# Patient Record
Sex: MALE | Race: WHITE | NOT HISPANIC OR LATINO | URBAN - METROPOLITAN AREA
[De-identification: names, ages, dates, MRNs, and addresses within clinical notes are randomized per-mention and may not be internally consistent; named-entity substitution may affect disease eponyms.]

---

## 2020-10-19 ENCOUNTER — EMERGENCY (EMERGENCY)
Facility: HOSPITAL | Age: 55
LOS: 1 days | Discharge: HOME | End: 2020-10-19
Attending: STUDENT IN AN ORGANIZED HEALTH CARE EDUCATION/TRAINING PROGRAM
Payer: COMMERCIAL

## 2020-10-19 VITALS
OXYGEN SATURATION: 99 % | RESPIRATION RATE: 18 BRPM | SYSTOLIC BLOOD PRESSURE: 191 MMHG | HEART RATE: 104 BPM | DIASTOLIC BLOOD PRESSURE: 105 MMHG | TEMPERATURE: 98 F

## 2020-10-19 VITALS — DIASTOLIC BLOOD PRESSURE: 98 MMHG | SYSTOLIC BLOOD PRESSURE: 163 MMHG

## 2020-10-19 PROCEDURE — 99284 EMERGENCY DEPT VISIT MOD MDM: CPT

## 2020-10-19 PROCEDURE — 93005 ELECTROCARDIOGRAM TRACING: CPT

## 2020-10-19 PROCEDURE — 99285 EMERGENCY DEPT VISIT HI MDM: CPT

## 2020-10-19 PROCEDURE — 93010 ELECTROCARDIOGRAM REPORT: CPT

## 2020-10-19 NOTE — ED PROVIDER NOTE - CARE PLAN
Principal Discharge DX:	Marijuana intoxication, uncomplicated  Secondary Diagnosis:	Elevated blood pressure, situational

## 2020-10-19 NOTE — ED PROVIDER NOTE - OBJECTIVE STATEMENT
56 y/o male with PMHx of    presents to ED c/o ingestion. Patient reports he was cleaning his sons room, opened a draw, and found some watermelon candy and ate it. Patient's son states they were THC candy, and patient started feeling weird afterwards. States everything was foggy, and was unable to tell if the words were coming out of his mouth while talking to his girlfriend. Reports he feels like he is still high, but is no longer panicked.    Denies drug use, N/V 56 y/o male with PMHx of presents to ED c/o ingestion. Patient reports he was cleaning his sons room, opened a draw, and found some watermelon candy and ate it. Patient started feeling weird afterwards. States everything was foggy, and was unable to tell if the words were coming out of his mouth while talking to his girlfriend. Patient called his son and found out they were THC candy. Reports he feels like he is still high, but is no longer panicked. Fels much better after finding out what he took.    Denies drug use, N/V

## 2020-10-19 NOTE — ED PROVIDER NOTE - NSFOLLOWUPINSTRUCTIONS_ED_ALL_ED_FT
1) Follow up with your doctor in 1 week for re-evaluation of your blood pressure  2) Return to the ER for worsening or concerning symptoms    Hypertension    WHAT YOU NEED TO KNOW:    Hypertension is high blood pressure. Your blood pressure is the force of your blood moving against the walls of your arteries. Hypertension causes your blood pressure to get so high that your heart has to work much harder than normal. This can damage your heart. The cause of hypertension may not be known. This is called essential or primary hypertension. Hypertension caused by another medical condition, such as kidney disease, is called secondary hypertension.    DISCHARGE INSTRUCTIONS:    Call 911 for any of the following:   •You have chest pain.      •You have any of the following signs of a heart attack: ?Squeezing, pressure, or pain in your chest      ?You may also have any of the following: ?Discomfort or pain in your back, neck, jaw, stomach, or arm      ?Shortness of breath      ?Nausea or vomiting      ?Lightheadedness or a sudden cold sweat        •You become confused or have difficulty speaking.      •You suddenly feel lightheaded or have trouble breathing.      Return to the emergency department if:   •You have a severe headache or vision loss.      •You have weakness in an arm or leg.       Contact your healthcare provider if:   •You feel faint, dizzy, confused, or drowsy.       •You have been taking your blood pressure medicine but your pressure is higher than your provider says it should be.      •You have questions or concerns about your condition or care.      Medicines: You may need any of the following:   •Antihypertensives may be used to help lower your blood pressure. Several kinds of medicines are available. Your healthcare provider will choose medicines based on the kind of hypertension you have. You may need more than one type of medicine. Take the medicine exactly as directed.       •Diuretics help decrease extra fluid that collects in your body. This will help lower your blood pressure. You may urinate more often while you take this medicine.      •Cholesterol medicine helps lower your cholesterol level. A low cholesterol level helps prevent heart disease and makes it easier to control your blood pressure.       •Take your medicine as directed. Contact your healthcare provider if you think your medicine is not helping or if you have side effects. Tell him or her if you are allergic to any medicine. Keep a list of the medicines, vitamins, and herbs you take. Include the amounts, and when and why you take them. Bring the list or the pill bottles to follow-up visits. Carry your medicine list with you in case of an emergency.      Follow up with your healthcare provider as directed: You will need to return to have your blood pressure checked and to have other lab tests done. Write down your questions so you remember to ask them during your visits.     Stages of hypertension:     Blood Pressure Readings     •Normal blood pressure is 119/79 or lower. Your healthcare provider may only check your blood pressure each year if it stays at a normal level.      •Elevated blood pressure is 120/79 to 129/79. This is sometimes called prehypertension. Your healthcare provider may suggest lifestyle changes to help lower your blood pressure to a normal level. He or she may then check it again in 3 to 6 months.      •Stage 1 hypertension is 130/80 to 139/89. Your provider may recommend lifestyle changes, medication, and checks every 3 to 6 months until your blood pressure is controlled.      •Stage 2 hypertension is 140/90 or higher. Your provider will recommend lifestyle changes and have you take 2 kinds of hypertension medicines. You will also need to have your blood pressure checked monthly until it is controlled.      Manage hypertension:   •Check your blood pressure at home. Avoid smoking, caffeine, and exercise at least 30 minutes before checking your blood pressure. Sit and rest for 5 minutes before you take your blood pressure. Extend your arm and support it on a flat surface. Your arm should be at the same level as your heart. Follow the directions that came with your blood pressure monitor. Check your blood pressure 2 times, 1 minute apart, before you take your medicine in the morning. Also check your blood pressure before your evening meal. Keep a record of your readings and bring it to your follow-up visits. Ask your healthcare provider what your blood pressure should be.   How to take a Blood Pressure           •Manage any other health conditions you have. Health conditions such as diabetes can increase your risk for hypertension. Follow your healthcare provider's instructions and take all your medicines as directed.       •Ask about all medicines. Certain medicines can increase your blood pressure. Examples include oral birth control pills, decongestants, herbal supplements, and NSAIDs, such as ibuprofen. Your healthcare provider can tell you which medicines are safe for you to take. This includes prescription and over-the-counter medicines.      Lifestyle changes you can make to manage hypertension:   •Limit sodium (salt) as directed. Too much sodium can affect your fluid balance. Check labels to find low-sodium or no-salt-added foods. Some low-sodium foods use potassium salts for flavor. Too much potassium can also cause health problems. Your healthcare provider will tell you how much sodium and potassium are safe for you to have in a day. He or she may recommend that you limit sodium to 2,300 mg a day.             •Follow the meal plan recommended by your healthcare provider. A dietitian or your provider can give you more information on low-sodium plans or the DASH (Dietary Approaches to Stop Hypertension) eating plan. The DASH plan is low in sodium, unhealthy fats, and total fat. It is high in potassium, calcium, and fiber.              •Exercise to maintain a healthy weight. Exercise at least 30 minutes per day, on most days of the week. This will help decrease your blood pressure. Ask your healthcare provider about the best exercise plan for you.   Walking for Exercise           •Decrease stress. This may help lower your blood pressure. Learn ways to relax, such as deep breathing or listening to music.      •Limit alcohol as directed. Alcohol can increase your blood pressure. A drink of alcohol is 12 ounces of beer, 5 ounces of wine, or 1½ ounces of liquor.      •Do not smoke. Nicotine and other chemicals in cigarettes and cigars can increase your blood pressure and also cause lung damage. Ask your healthcare provider for information if you currently smoke and need help to quit. E-cigarettes or smokeless tobacco still contain nicotine. Talk to your healthcare provider before you use these products.   Prevent Heart Disease                  What You Need to Know About Marijuana Use      Marijuana is a mixture of the dried leaves and flowers of the hemp plant Cannabis sativa. The plant's active ingredients (cannabinoids) change the chemistry of the brain. If you smoke or eat marijuana, you will experience changes in the way you think, feel, and behave.  Many people use marijuana because it helps them relax and puts them in a pleasurable mood (marijuana high). Some people use marijuana for medical effects, such as:  •Reduced nausea.      •Increased appetite.      •Reduced muscle spasm.      •Pain relief.      Researchers are studying other possible medical uses for marijuana.      How can marijuana use affect me?    Many people find a marijuana high to be pleasurable and relaxing. Other people find a marijuana high to be uncomfortable or anxiety-causing. This drug can cause short-term and long-term physical and mental effects. Taking high doses of marijuana or trying to quit marijuana can also affect you.  Short-term effects of marijuana use include:  •Temporary relief of symptoms from a medical condition.      •Changes in mood and perception (feeling high).      •Increased hunger.      •Increased heart rate.      •Slowed movement and reaction time.      •Poor memory, judgment, and problem solving ability.      •Altered sense of time.      •Changes to vision.      •Bloodshot eyes.      •Coughing.    Long-term effects of marijuana use include:  •Higher risk of lung and breathing problems.      •Higher risk of heart attack.      •Higher risk of testicular cancer.      •Mental and physical dependence (addiction).      •Slowed brain development in young people. Babies whose mothers used marijuana during pregnancy have an increased risk of problems with brain development and behavior.      •Temporary periods of false perceptions or beliefs (hallucinations or paranoia).      •Worsening of mental illness.      •Onset of new mental illness such as anxiety, depression, or suicidal thoughts.      •Withdrawal symptoms when stopping marijuana, such as sleeplessness, anxiety, cravings, and anger.      •Difficulty maintaining healthy relationships.      •Poor memory, and difficulty concentrating and learning. This can result in decreased intelligence and poor performance at school or work, and an increased risk of dropping out of school.      •Higher risk of using other substances like alcohol and nicotine.    High doses of marijuana can cause:  •Panic.      •Anxiety.      •Mental confusion.      •Hallucinations.    Quitting marijuana after using it for a long time can cause withdrawal symptoms, such as:  •Headache.      •Shakiness.      •Sweating.      •Stomach pain.      •Nausea.      •Restlessness.      •Irritability.      •Trouble sleeping.      •Decreased appetite.        What are the benefits of not using marijuana?    Not using marijuana can keep you from becoming dependent on it. You can avoid the negative effects of the drug that can reduce your quality of life. You can avoid accidents caused by the slowed reaction time that is common with marijuana use.      If I already use marijuana, what steps can I take to stop using it?    If you are not physically or mentally dependent on marijuana, you should be able to stop using it on your own. If you cannot stop on your own, ask your health care provider for help. Treatment for marijuana addiction is similar to treatment for other addictions. It may include:  •Cognitive-behavioral therapy (psychotherapy). This may include individual or group therapy.      •Joining a support group.      •Treating medical, behavioral, or mental health conditions that exist along with marijuana dependency.        Where can I get more information?  Learn more about:  •Marijuana from the [place] National [place] on Drug Abuse: https://www.drugabuse.gov/publications/drugfacts/marijuana      •Medical marijuana from the National Institutes of Health: https://nccih.nih.gov/health/marijuana      •Treatment options from the Substance Abuse and Mental Health Services Administration: https://findtreatment.[facility].gov/      •Recovery from marijuana dependency from Recovery.org: http://www.recovery.org/topics/marijuana-recovery        When should I seek medical care?  Talk with your health care provider if:  •You want to stop using marijuana but you cannot.      •You have withdrawal symptoms when you try to stop using marijuana.      •You are using marijuana every day.      •You are using marijuana along with other drugs like cocaine or alcohol.      •You have anxiety or depression.      •You have hallucinations or paranoia.      •Marijuana use is interfering with your relationships or your ability to function normally at school or at work.        Summary    •You may become physically or mentally dependent on marijuana.      •Long-term use may interfere with your ability to function normally at home, school, or work.      •Marijuana addiction is treatable.      This information is not intended to replace advice given to you by your health care provider. Make sure you discuss any questions you have with your health care provider.

## 2020-10-19 NOTE — ED ADULT TRIAGE NOTE - CHIEF COMPLAINT QUOTE
Unknowingly ate a cannabis edible. Was anxious and "foggy" but feels better now that he knows what it was. Hypertensive, no history of htn.

## 2020-10-19 NOTE — ED PROVIDER NOTE - PATIENT PORTAL LINK FT
You can access the FollowMyHealth Patient Portal offered by [facility] by registering at the following website: [email or web address]. By joining [facility]’s FollowMyHealth portal, you will also be able to view your health information using other applications (apps) compatible with our system.

## 2020-10-19 NOTE — ED PROVIDER NOTE - CLINICAL SUMMARY MEDICAL DECISION MAKING FREE TEXT BOX
Patient accidently ingested THC. Patient is mildly lightheaded, but otherwise well. Clearly suffering from effects of THC that was ingested, but no concerning symptoms, will re-assess vitals and likely D/C home into the care of his girlfriend.

## 2020-11-19 PROBLEM — Z78.9 OTHER SPECIFIED HEALTH STATUS: Chronic | Status: ACTIVE | Noted: 2020-10-21

## 2021-01-26 ENCOUNTER — APPOINTMENT (OUTPATIENT)
Dept: FAMILY MEDICINE | Facility: CLINIC | Age: 56
End: 2021-01-26
Payer: COMMERCIAL

## 2021-01-26 ENCOUNTER — TRANSCRIPTION ENCOUNTER (OUTPATIENT)
Age: 56
End: 2021-01-26

## 2021-01-26 ENCOUNTER — NON-APPOINTMENT (OUTPATIENT)
Age: 56
End: 2021-01-26

## 2021-01-26 VITALS
OXYGEN SATURATION: 99 % | TEMPERATURE: 97.5 F | RESPIRATION RATE: 14 BRPM | HEIGHT: 70.5 IN | HEART RATE: 69 BPM | DIASTOLIC BLOOD PRESSURE: 98 MMHG | SYSTOLIC BLOOD PRESSURE: 140 MMHG | BODY MASS INDEX: 29.73 KG/M2 | WEIGHT: 210 LBS

## 2021-01-26 DIAGNOSIS — Z11.52 ENCOUNTER FOR SCREENING FOR COVID-19: ICD-10-CM

## 2021-01-26 DIAGNOSIS — Z72.89 OTHER PROBLEMS RELATED TO LIFESTYLE: ICD-10-CM

## 2021-01-26 DIAGNOSIS — R17 UNSPECIFIED JAUNDICE: ICD-10-CM

## 2021-01-26 DIAGNOSIS — Z63.5 DISRUPTION OF FAMILY BY SEPARATION AND DIVORCE: ICD-10-CM

## 2021-01-26 DIAGNOSIS — Z00.00 ENCOUNTER FOR GENERAL ADULT MEDICAL EXAMINATION W/OUT ABNORMAL FINDINGS: ICD-10-CM

## 2021-01-26 DIAGNOSIS — Z80.42 FAMILY HISTORY OF MALIGNANT NEOPLASM OF PROSTATE: ICD-10-CM

## 2021-01-26 DIAGNOSIS — Z80.52 FAMILY HISTORY OF MALIGNANT NEOPLASM OF BLADDER: ICD-10-CM

## 2021-01-26 PROCEDURE — 99072 ADDL SUPL MATRL&STAF TM PHE: CPT

## 2021-01-26 PROCEDURE — 99386 PREV VISIT NEW AGE 40-64: CPT

## 2021-01-26 SDOH — SOCIAL STABILITY - SOCIAL INSECURITY: DISRUPTION OF FAMILY BY SEPARATION AND DIVORCE: Z63.5

## 2021-01-26 NOTE — ASSESSMENT
[FreeTextEntry1] : HM: \par will do comprehensive blood work , screen for hyperlipidemia , diabetes and thyroid disorder.\par will review the results and address if abnormal.\par Will also do a PSA to screen for prostate CA.\par Elevated BP: \par pt. reported that he BP stays with in normal range at home , he have a machine at home. He thinks its probably white coat syndrome. \par Advised DASH diet, home monitoring of BP.\par If BP reading stays high multiple days , recommended for a f/u appt.

## 2021-01-26 NOTE — HEALTH RISK ASSESSMENT
[Excellent] : ~his/her~  mood as  excellent [No] : In the past 12 months have you used drugs other than those required for medical reasons? No [No falls in past year] : Patient reported no falls in the past year [0] : 2) Feeling down, depressed, or hopeless: Not at all (0) [Patient reported colonoscopy was normal] : Patient reported colonoscopy was normal [HIV test declined] : HIV test declined [Hepatitis C test declined] : Hepatitis C test declined [None] : None [With Family] : lives with family [Employed] : employed [Sexually Active] : sexually active [Feels Safe at Home] : Feels safe at home [Fully functional (bathing, dressing, toileting, transferring, walking, feeding)] : Fully functional (bathing, dressing, toileting, transferring, walking, feeding) [Reports changes in dental health] : Reports changes in dental health [Smoke Detector] : smoke detector [Carbon Monoxide Detector] : carbon monoxide detector [Seat Belt] :  uses seat belt [] : No [Change in mental status noted] : No change in mental status noted [Language] : denies difficulty with language [Behavior] : denies difficulty with behavior [High Risk Behavior] : no high risk behavior [Reports changes in hearing] : Reports no changes in hearing [Reports changes in vision] : Reports no changes in vision [ColonoscopyDate] : 10/02/2016 [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

## 2021-01-26 NOTE — HISTORY OF PRESENT ILLNESS
[de-identified] : Mr. YOKASTA SY is White  55 year M seen today for wellness exam. He have no known medical history. He is taking any prescription or OTC medication.\par He is also requesting for a covid-19 antibody test.\par \par

## 2021-01-27 DIAGNOSIS — R97.20 ELEVATED PROSTATE, SPECIFIC ANTIGEN [PSA]: ICD-10-CM

## 2021-01-27 LAB
ALBUMIN SERPL ELPH-MCNC: 5.1 G/DL
ALP BLD-CCNC: 70 U/L
ALT SERPL-CCNC: 20 U/L
ANION GAP SERPL CALC-SCNC: 14 MMOL/L
APPEARANCE: CLEAR
AST SERPL-CCNC: 21 U/L
BACTERIA: NEGATIVE
BASOPHILS # BLD AUTO: 0.07 K/UL
BASOPHILS NFR BLD AUTO: 1 %
BILIRUB SERPL-MCNC: 1.5 MG/DL
BILIRUBIN URINE: NEGATIVE
BLOOD URINE: NEGATIVE
BUN SERPL-MCNC: 22 MG/DL
CALCIUM SERPL-MCNC: 10.3 MG/DL
CHLORIDE SERPL-SCNC: 103 MMOL/L
CHOLEST SERPL-MCNC: 191 MG/DL
CO2 SERPL-SCNC: 23 MMOL/L
COLOR: COLORLESS
CREAT SERPL-MCNC: 1.08 MG/DL
EOSINOPHIL # BLD AUTO: 0.26 K/UL
EOSINOPHIL NFR BLD AUTO: 3.6 %
ESTIMATED AVERAGE GLUCOSE: 103 MG/DL
GLUCOSE QUALITATIVE U: NEGATIVE
GLUCOSE SERPL-MCNC: 102 MG/DL
HBA1C MFR BLD HPLC: 5.2 %
HCT VFR BLD CALC: 51.3 %
HDLC SERPL-MCNC: 49 MG/DL
HGB BLD-MCNC: 17.1 G/DL
HYALINE CASTS: 0 /LPF
IMM GRANULOCYTES NFR BLD AUTO: 0.3 %
KETONES URINE: NEGATIVE
LDLC SERPL CALC-MCNC: 119 MG/DL
LEUKOCYTE ESTERASE URINE: NEGATIVE
LYMPHOCYTES # BLD AUTO: 1.87 K/UL
LYMPHOCYTES NFR BLD AUTO: 25.8 %
MAN DIFF?: NORMAL
MCHC RBC-ENTMCNC: 30.6 PG
MCHC RBC-ENTMCNC: 33.3 GM/DL
MCV RBC AUTO: 91.8 FL
MICROSCOPIC-UA: NORMAL
MONOCYTES # BLD AUTO: 0.71 K/UL
MONOCYTES NFR BLD AUTO: 9.8 %
NEUTROPHILS # BLD AUTO: 4.32 K/UL
NEUTROPHILS NFR BLD AUTO: 59.5 %
NITRITE URINE: NEGATIVE
NONHDLC SERPL-MCNC: 142 MG/DL
PH URINE: 6
PLATELET # BLD AUTO: 225 K/UL
POTASSIUM SERPL-SCNC: 5 MMOL/L
PROT SERPL-MCNC: 7.4 G/DL
PROTEIN URINE: NEGATIVE
PSA SERPL-MCNC: 5.29 NG/ML
RBC # BLD: 5.59 M/UL
RBC # FLD: 12.5 %
RED BLOOD CELLS URINE: 0 /HPF
SARS-COV-2 IGG SERPL IA-ACNC: 10 INDEX
SARS-COV-2 IGG SERPL QL IA: POSITIVE
SODIUM SERPL-SCNC: 140 MMOL/L
SPECIFIC GRAVITY URINE: 1
SQUAMOUS EPITHELIAL CELLS: 0 /HPF
TRIGL SERPL-MCNC: 116 MG/DL
TSH SERPL-ACNC: 3.71 UIU/ML
UROBILINOGEN URINE: NORMAL
WBC # FLD AUTO: 7.25 K/UL
WHITE BLOOD CELLS URINE: 0 /HPF

## 2021-02-03 ENCOUNTER — APPOINTMENT (OUTPATIENT)
Dept: INTERNAL MEDICINE | Facility: CLINIC | Age: 56
End: 2021-02-03

## 2022-02-15 ENCOUNTER — APPOINTMENT (OUTPATIENT)
Dept: INTERNAL MEDICINE | Facility: CLINIC | Age: 57
End: 2022-02-15

## 2022-06-11 ENCOUNTER — INPATIENT (INPATIENT)
Facility: HOSPITAL | Age: 57
LOS: 1 days | Discharge: HOME | DRG: 482 | End: 2022-06-13
Attending: ORTHOPAEDIC SURGERY | Admitting: ORTHOPAEDIC SURGERY
Payer: COMMERCIAL

## 2022-06-11 VITALS — WEIGHT: 188.05 LBS | HEIGHT: 70 IN

## 2022-06-11 DIAGNOSIS — S72.009A FRACTURE OF UNSPECIFIED PART OF NECK OF UNSPECIFIED FEMUR, INITIAL ENCOUNTER FOR CLOSED FRACTURE: ICD-10-CM

## 2022-06-11 LAB
ALBUMIN SERPL ELPH-MCNC: 4.5 G/DL — SIGNIFICANT CHANGE UP (ref 3.3–5)
ALP SERPL-CCNC: 73 U/L — SIGNIFICANT CHANGE UP (ref 40–120)
ALT FLD-CCNC: 32 U/L — SIGNIFICANT CHANGE UP (ref 12–78)
ANION GAP SERPL CALC-SCNC: 7 MMOL/L — SIGNIFICANT CHANGE UP (ref 5–17)
APTT BLD: 28.9 SEC — SIGNIFICANT CHANGE UP (ref 27.5–35.5)
AST SERPL-CCNC: 24 U/L — SIGNIFICANT CHANGE UP (ref 15–37)
BASOPHILS # BLD AUTO: 0.09 K/UL — SIGNIFICANT CHANGE UP (ref 0–0.2)
BASOPHILS NFR BLD AUTO: 0.6 % — SIGNIFICANT CHANGE UP (ref 0–2)
BILIRUB SERPL-MCNC: 1.2 MG/DL — SIGNIFICANT CHANGE UP (ref 0.2–1.2)
BUN SERPL-MCNC: 19 MG/DL — SIGNIFICANT CHANGE UP (ref 7–23)
CALCIUM SERPL-MCNC: 9.5 MG/DL — SIGNIFICANT CHANGE UP (ref 8.5–10.1)
CHLORIDE SERPL-SCNC: 109 MMOL/L — HIGH (ref 96–108)
CO2 SERPL-SCNC: 25 MMOL/L — SIGNIFICANT CHANGE UP (ref 22–31)
CREAT SERPL-MCNC: 1.01 MG/DL — SIGNIFICANT CHANGE UP (ref 0.5–1.3)
EGFR: 86 ML/MIN/1.73M2 — SIGNIFICANT CHANGE UP
EOSINOPHIL # BLD AUTO: 0.2 K/UL — SIGNIFICANT CHANGE UP (ref 0–0.5)
EOSINOPHIL NFR BLD AUTO: 1.4 % — SIGNIFICANT CHANGE UP (ref 0–6)
GLUCOSE SERPL-MCNC: 112 MG/DL — HIGH (ref 70–99)
HCT VFR BLD CALC: 46.2 % — SIGNIFICANT CHANGE UP (ref 39–50)
HGB BLD-MCNC: 16 G/DL — SIGNIFICANT CHANGE UP (ref 13–17)
IMM GRANULOCYTES NFR BLD AUTO: 0.5 % — SIGNIFICANT CHANGE UP (ref 0–1.5)
INR BLD: 1.03 RATIO — SIGNIFICANT CHANGE UP (ref 0.88–1.16)
LYMPHOCYTES # BLD AUTO: 1.33 K/UL — SIGNIFICANT CHANGE UP (ref 1–3.3)
LYMPHOCYTES # BLD AUTO: 9.3 % — LOW (ref 13–44)
MCHC RBC-ENTMCNC: 30.3 PG — SIGNIFICANT CHANGE UP (ref 27–34)
MCHC RBC-ENTMCNC: 34.6 GM/DL — SIGNIFICANT CHANGE UP (ref 32–36)
MCV RBC AUTO: 87.5 FL — SIGNIFICANT CHANGE UP (ref 80–100)
MONOCYTES # BLD AUTO: 0.94 K/UL — HIGH (ref 0–0.9)
MONOCYTES NFR BLD AUTO: 6.6 % — SIGNIFICANT CHANGE UP (ref 2–14)
NEUTROPHILS # BLD AUTO: 11.7 K/UL — HIGH (ref 1.8–7.4)
NEUTROPHILS NFR BLD AUTO: 81.6 % — HIGH (ref 43–77)
PLATELET # BLD AUTO: 220 K/UL — SIGNIFICANT CHANGE UP (ref 150–400)
POTASSIUM SERPL-MCNC: 4.7 MMOL/L — SIGNIFICANT CHANGE UP (ref 3.5–5.3)
POTASSIUM SERPL-SCNC: 4.7 MMOL/L — SIGNIFICANT CHANGE UP (ref 3.5–5.3)
PROT SERPL-MCNC: 7.5 GM/DL — SIGNIFICANT CHANGE UP (ref 6–8.3)
PROTHROM AB SERPL-ACNC: 12 SEC — SIGNIFICANT CHANGE UP (ref 10.5–13.4)
RBC # BLD: 5.28 M/UL — SIGNIFICANT CHANGE UP (ref 4.2–5.8)
RBC # FLD: 12.1 % — SIGNIFICANT CHANGE UP (ref 10.3–14.5)
SODIUM SERPL-SCNC: 141 MMOL/L — SIGNIFICANT CHANGE UP (ref 135–145)
WBC # BLD: 14.33 K/UL — HIGH (ref 3.8–10.5)
WBC # FLD AUTO: 14.33 K/UL — HIGH (ref 3.8–10.5)

## 2022-06-11 PROCEDURE — 82652 VIT D 1 25-DIHYDROXY: CPT

## 2022-06-11 PROCEDURE — 93005 ELECTROCARDIOGRAM TRACING: CPT

## 2022-06-11 PROCEDURE — 85730 THROMBOPLASTIN TIME PARTIAL: CPT

## 2022-06-11 PROCEDURE — 93010 ELECTROCARDIOGRAM REPORT: CPT

## 2022-06-11 PROCEDURE — 85027 COMPLETE CBC AUTOMATED: CPT

## 2022-06-11 PROCEDURE — 99222 1ST HOSP IP/OBS MODERATE 55: CPT

## 2022-06-11 PROCEDURE — 80048 BASIC METABOLIC PNL TOTAL CA: CPT

## 2022-06-11 PROCEDURE — 72192 CT PELVIS W/O DYE: CPT | Mod: 26,MA

## 2022-06-11 PROCEDURE — 97116 GAIT TRAINING THERAPY: CPT | Mod: GP

## 2022-06-11 PROCEDURE — 82330 ASSAY OF CALCIUM: CPT

## 2022-06-11 PROCEDURE — 71045 X-RAY EXAM CHEST 1 VIEW: CPT | Mod: 26

## 2022-06-11 PROCEDURE — 73552 X-RAY EXAM OF FEMUR 2/>: CPT | Mod: 26,RT

## 2022-06-11 PROCEDURE — 99285 EMERGENCY DEPT VISIT HI MDM: CPT

## 2022-06-11 PROCEDURE — 76376 3D RENDER W/INTRP POSTPROCES: CPT | Mod: 26

## 2022-06-11 PROCEDURE — 85610 PROTHROMBIN TIME: CPT

## 2022-06-11 PROCEDURE — 97162 PT EVAL MOD COMPLEX 30 MIN: CPT | Mod: GP

## 2022-06-11 PROCEDURE — 73502 X-RAY EXAM HIP UNI 2-3 VIEWS: CPT | Mod: 26,RT

## 2022-06-11 PROCEDURE — C1713: CPT

## 2022-06-11 PROCEDURE — 82306 VITAMIN D 25 HYDROXY: CPT

## 2022-06-11 PROCEDURE — 36415 COLL VENOUS BLD VENIPUNCTURE: CPT

## 2022-06-11 PROCEDURE — 76000 FLUOROSCOPY <1 HR PHYS/QHP: CPT

## 2022-06-11 PROCEDURE — 86803 HEPATITIS C AB TEST: CPT

## 2022-06-11 RX ORDER — OXYCODONE HYDROCHLORIDE 5 MG/1
5 TABLET ORAL EVERY 4 HOURS
Refills: 0 | Status: DISCONTINUED | OUTPATIENT
Start: 2022-06-11 | End: 2022-06-13

## 2022-06-11 RX ORDER — MAGNESIUM HYDROXIDE 400 MG/1
30 TABLET, CHEWABLE ORAL DAILY
Refills: 0 | Status: DISCONTINUED | OUTPATIENT
Start: 2022-06-11 | End: 2022-06-13

## 2022-06-11 RX ORDER — SODIUM CHLORIDE 9 MG/ML
1000 INJECTION, SOLUTION INTRAVENOUS
Refills: 0 | Status: DISCONTINUED | OUTPATIENT
Start: 2022-06-11 | End: 2022-06-13

## 2022-06-11 RX ORDER — METOCLOPRAMIDE HCL 10 MG
10 TABLET ORAL EVERY 6 HOURS
Refills: 0 | Status: DISCONTINUED | OUTPATIENT
Start: 2022-06-11 | End: 2022-06-13

## 2022-06-11 RX ORDER — HEPARIN SODIUM 5000 [USP'U]/ML
5000 INJECTION INTRAVENOUS; SUBCUTANEOUS EVERY 8 HOURS
Refills: 0 | Status: COMPLETED | OUTPATIENT
Start: 2022-06-11 | End: 2022-06-11

## 2022-06-11 RX ORDER — OXYCODONE HYDROCHLORIDE 5 MG/1
10 TABLET ORAL EVERY 4 HOURS
Refills: 0 | Status: DISCONTINUED | OUTPATIENT
Start: 2022-06-11 | End: 2022-06-12

## 2022-06-11 RX ADMIN — OXYCODONE HYDROCHLORIDE 5 MILLIGRAM(S): 5 TABLET ORAL at 22:33

## 2022-06-11 RX ADMIN — HEPARIN SODIUM 5000 UNIT(S): 5000 INJECTION INTRAVENOUS; SUBCUTANEOUS at 21:40

## 2022-06-11 RX ADMIN — SODIUM CHLORIDE 100 MILLILITER(S): 9 INJECTION, SOLUTION INTRAVENOUS at 21:24

## 2022-06-11 NOTE — ED STATDOCS - WR ORDER NAME 3
6/7/2018    2130 hours    Procedure Note    Procedure: Lumbar Puncture  Pre op Dx: Seizure, Encephalopathy  Post of Dx: Same  Anaesthesia used for procedure: 2% Lidocaine solution infiltrated into area  Estimated Blood Loss: 0    Patient tolerated procedure well; no complications from the procedure  Time out taken to confirm patient identity, procedure prior to procedure.    Fluid Color   - Clear  Opening Pressure  - 16    Spinal fluid sent for analysis    After informed consent for the procedure obtained from patient's wife, patient positioned on his right side.  Spinal interspace at level of iliac crest anaesthetized with 2% lidocaine solution.  Spinal processes palpated with difficulty secondary to patient's body habitus.  Spinal needle then inserted and advanced until the spinal space reached; opening pressure noted to be 16; clear spinal fluid obtained.  4 sample containers used to collect spinal fluid and sent for analysis.  The spinal needle was then removed and the needle insertion site covered with band-aid.  The patient tolerated procedure well; there were no complications from the procedure.    Gunner Han MD       Xray Chest 1 View- PORTABLE-Urgent

## 2022-06-11 NOTE — ED STATDOCS - NS ED ATTENDING STATEMENT MOD
This was a shared visit with the GENA. I reviewed and verified the documentation and independently performed the documented:

## 2022-06-11 NOTE — ED ADULT NURSE REASSESSMENT NOTE - NS ED NURSE REASSESS COMMENT FT1
Received report from NICKI EMERSON. Pt A&Ox4. VS stable. No s/s of distress. Pt has no pain. Family at bedside.

## 2022-06-11 NOTE — ED ADULT TRIAGE NOTE - CHIEF COMPLAINT QUOTE
Pt presents to the ED c/o right hip pain. Pt states he jumped backwards and fell on his right hip. Denies numbness/tingling. Pt reports difficulty ambulating. Pt took Advil PTA.

## 2022-06-11 NOTE — ED ADULT NURSE NOTE - OBJECTIVE STATEMENT
Pt presents to ED c/o R hip pain. Pt unable to ambulate at this time. AAOx3. Respirations even and unlabored, NAD. Skin warm, dry, color appropriate.

## 2022-06-11 NOTE — ED STATDOCS - PHYSICAL EXAMINATION
PA NOTE: GEN: AOX3, NAD. HEENT: Throat clear. Airway is patent. EYES: PERRLA. EOMI. Head: NC/AT. NECK: Supple, No JVD. FROM. C-spine non-tender. CV:S1S2, RRR, LUNGS: Non-labored breathing, no tachypnea. O2sat 100% RA. CTA b/l. No w/r/r. CHEST: Equal chest expansion and rise. No deformity. ABD: Soft, NT/ND, no rebound, no guarding. No CVAT. EXT: No e/c/c. 2+ distal pulses. RLE: +Tenderness right hip. +LROM. Unable to weight bear. Unable to straight raise RLE. 2+ distal pulses. SKIN: No rashes. NEURO: No focal deficits. CN II-XII intact. FROM. 5/5 motor and sensory. ~Jean-Pierre Worrell PA-C PA NOTE: GEN: AOX3, NAD. HEENT: Throat clear. Airway is patent. EYES: PERRLA. EOMI. Head: NC/AT. NECK: Supple, No JVD. FROM. C-spine non-tender. CV:S1S2, RRR, LUNGS: Non-labored breathing, no tachypnea. O2sat 100% RA. CTA b/l. No w/r/r. CHEST: Equal chest expansion and rise. No deformity. ABD: Soft, NT/ND, no rebound, no guarding. No CVAT. EXT: No e/c/c. 2+ distal pulses. RLE: +Tenderness right hip. +LROM. Unable to weight bear. Unable to straight raise RLE. 2+ distal pulses. SKIN: No rashes. NEURO: No focal deficits. CN II-XII intact. FROM. 5/5 motor and sensory. ~Jean-Pierre Worrell PA-C    Agree with above. Aly Montes D.O.

## 2022-06-11 NOTE — CONSULT NOTE ADULT - ASSESSMENT
The patient is a 58 yo male with no significant PMH who sustained R femoral neck subcapital fracture secondary to mechanical fall  assessment Dx:                       1. R femoral neck fracture                       2. R hip pain               Plan:    The patient is in optimal medical condition for R hip ORIF.

## 2022-06-11 NOTE — ED STATDOCS - OBJECTIVE STATEMENT
56 yo male w/ no pertinent PMHx presents to the ED for right hip pain. Pt states he was trying to get rid of baby birds that he thought were dead when they came at home. Pt was startled and fell. Pt states he is not able to No other complaints at this time. Pt took 4 Advil PTA. Pt is not able to lift his leg.

## 2022-06-11 NOTE — ED ADULT NURSE NOTE - NSIMPLEMENTINTERV_GEN_ALL_ED
Implemented All Fall Risk Interventions:  [place] to call system. Call bell, personal items and telephone within reach. Instruct patient to call for assistance. Room bathroom lighting operational. Non-slip footwear when patient is off stretcher. Physically safe environment: no spills, clutter or unnecessary equipment. Stretcher in lowest position, wheels locked, appropriate side rails in place. Provide visual cue, wrist band, yellow gown, etc. Monitor gait and stability. Monitor for mental status changes and reorient to person, place, and time. Review medications for side effects contributing to fall risk. Reinforce activity limits and safety measures with patient and family.

## 2022-06-11 NOTE — ED STATDOCS - PROGRESS NOTE DETAILS
+Right Hip fracture on xrays. Dr. Montes spoke with ORTHO, will see patient in ED shortly. Pre-Op labs ordered. ~Jean-Pierre Worrell PA-C PA: Patient is a 56 yo male with no pertinent PMHx who presents to ED c/o right hip injury s/p twist and fall hard on concrete, injuring his right hip. Pt states he was trying to get rid of baby birds that he thought were dead when they came at home. Pt was startled and fell. ~Jean-Pierre Worrell PA-C

## 2022-06-11 NOTE — CONSULT NOTE ADULT - SUBJECTIVE AND OBJECTIVE BOX
CC:Patient is a 57y old  Male who presents with a chief complaint of  R hip fracture     HPI:  57y Male [facility]/o severe R hip pain and inability to ambulate.  Patient denies headstrike or LOC. Localizes pain to R hip/femur. Patient denies radiation of pain. Patient denies numbness/tingling/burning in the RLE. No other bone/joint complaints. Patient is a community ambulator at baseline without assistive devices. Patient has no issues w/ ADLs/IADLs. Denies any medical history. Denies history of cancer or metastatic lesions.    PAST MEDICAL & SURGICAL HISTORY:  No pertinent past medical history      No significant past surgical history        MEDICATIONS  (STANDING):  lactated ringers. 1000 milliLiter(s) (100 mL/Hr) IV Continuous <Continuous>    MEDICATIONS  (PRN):    Allergies: No Known Allergies    MEDICATIONS  (PRN):  magnesium hydroxide Suspension 30 milliLiter(s) Oral daily PRN Constipation  metoclopramide Injectable 10 milliGRAM(s) IV Push every 6 hours PRN Nausea and/or Vomiting  oxyCODONE    IR 10 milliGRAM(s) Oral every 4 hours PRN Moderate Pain (4 - 6)  oxyCODONE    IR 5 milliGRAM(s) Oral every 4 hours PRN Mild Pain (1 - 3)    Allergies: No Known Allergies    Intolerances      Social: no smoking, no alcohol, no illegal drugs; no recent travel, no exposure to TB  FAMILY HISTORY: father  at age 91 of old age. Mother  from traumatic ICH.    ROS: the patient denies fever, no chills, no HA, no dizziness, no sore throat, no blurry vision, no CP, no palpitations, no SOB, no cough, no abdominal pain, no diarrhea, no N/V, no dysuria, no leg pain, no claudication, no rash, no joint aches, no rectal pain or bleeding, no night sweats    Vital Signs Last 24 Hrs  T(C): 37 (2022 22:30), Max: 37 (2022 22:30)  T(F): 98.6 (2022 22:30), Max: 98.6 (2022 22:30)  HR: 75 (2022 22:30) (75 - 80)  BP: 120/78 (2022 22:30) (120/78 - 139/83)  BP(mean): 98 (2022 19:49) (98 - 98)  RR: 18 (2022 22:30) (18 - 18)  SpO2: 97% (2022 22:30) (97% - 98%)  Daily Height in cm: 177.8 (2022 19:30)    Daily     PE:    Constitutional:   HEENT:  EOMI, PERRLA  Neck: supple  Back: no tenderness  Respiratory: clear  Cardiovascular: S1S2 regular, no murmurs  Abdomen: soft, not tender, not distended, positive BS  Genitourinary: deferred  Rectal: deferred  Musculoskeletal: R hip tenderness with palpation, mobilization.  Extremities: no pedal edema  Neurological: AxOx3, moving all extremities, no focal deficits  Skin: no rashes    Labs:                        16.0   14.33 )-----------( 220      ( 2022 20:49 )             46.2     06-11    141  |  109<H>  |  19  ----------------------------<  112<H>  4.7   |  25  |  1.01    Ca    9.5      2022 20:49    TPro  7.5  /  Alb  4.5  /  TBili  1.2  /  DBili  x   /  AST  24  /  ALT  32  /  AlkPhos  73  06-11     LIVER FUNCTIONS - ( 2022 20:49 )  Alb: 4.5 g/dL / Pro: 7.5 gm/dL / ALK PHOS: 73 U/L / ALT: 32 U/L / AST: 24 U/L / GGT: x           Radiology:   CTpelvis: Impacted subcapital fracture of the right femoral neck with mild varus   and posterior angulation.    EKG NSR no acute ST changes

## 2022-06-11 NOTE — H&P ADULT - HISTORY OF PRESENT ILLNESS
57y Male presents to [facility] severe R hip pain and inability to ambulate.  Patient denies headstrike or LOC. Localizes pain to R hip/femur. Patient denies radiation of pain. Patient denies numbness/tingling/burning in the RLE. No other bone/joint complaints. Patient is a community ambulator at baseline without assistive devices. Patient has no issues w/ ADLs/IADLs. Denies any medical history. Denies history of cancer or metastatic lesions.    PAST MEDICAL & SURGICAL HISTORY:  No pertinent past medical history      No significant past surgical history        MEDICATIONS  (STANDING):  lactated ringers. 1000 milliLiter(s) (100 mL/Hr) IV Continuous <Continuous>    MEDICATIONS  (PRN):    Allergies    No Known Allergies    Intolerances        T(C): 36.9 (06-11-22 @ 19:49), Max: 36.9 (06-11-22 @ 19:49)  HR: 80 (06-11-22 @ 19:49) (80 - 80)  BP: 139/83 (06-11-22 @ 19:49) (139/83 - 139/83)  RR: 18 (06-11-22 @ 19:49) (18 - 18)  SpO2: 98% (06-11-22 @ 19:49) (98% - 98%)  Wt(kg): --    PE   RLE:  Skin intact; No ecchymosis/soft tissue swelling  Compartments soft; + TTP about hip. No TTP to knee/leg/ankle/foot   ROM lmited 2/2 pain   Unable to SLR; + Log Roll/Heel Strike  Motor intact GS/TA/FHL/EHL  SILT L2-S1  DP/PT pulses 2+    LLE/BUE:   No bony TTP; Good ROM w/o pain; Exam Unremarkable    Imaging:  XR demonstrating R nondisplaced femoral neck fracture    57y Male with a right nondisplaced femoral neck fracture  - Pain control  - NPO/IVF after midnight   - FU AM Labs  - EKG/CXR  - Please document Medical clearance  - Plan for OR for ORIF tomorrow AM

## 2022-06-11 NOTE — H&P ADULT - ATTENDING COMMENTS
Orthopedic Sports Attending:    Agree with above resident/PA note.  Note edited where necessary.      Patient seen and examined at bedside. Patient sustained a trip and fall onto his right hip. Sustained a minimally displaced subcapital hip fracture. Discussed the risks, complications, benefits and alternatives of care. Confirmed procedure and site. Patient fully understands and would like to proceed with surgery. Plan will be ORIF with synthes femoral neck system.    Mello Jose, DO  Orthopaedic Surgery

## 2022-06-12 ENCOUNTER — TRANSCRIPTION ENCOUNTER (OUTPATIENT)
Age: 57
End: 2022-06-12

## 2022-06-12 LAB
ANION GAP SERPL CALC-SCNC: 6 MMOL/L — SIGNIFICANT CHANGE UP (ref 5–17)
ANION GAP SERPL CALC-SCNC: 8 MMOL/L — SIGNIFICANT CHANGE UP (ref 5–17)
APTT BLD: 30.7 SEC — SIGNIFICANT CHANGE UP (ref 27.5–35.5)
BUN SERPL-MCNC: 16 MG/DL — SIGNIFICANT CHANGE UP (ref 7–23)
BUN SERPL-MCNC: 20 MG/DL — SIGNIFICANT CHANGE UP (ref 7–23)
CALCIUM SERPL-MCNC: 8.5 MG/DL — SIGNIFICANT CHANGE UP (ref 8.5–10.1)
CALCIUM SERPL-MCNC: 8.9 MG/DL — SIGNIFICANT CHANGE UP (ref 8.5–10.1)
CHLORIDE SERPL-SCNC: 110 MMOL/L — HIGH (ref 96–108)
CHLORIDE SERPL-SCNC: 110 MMOL/L — HIGH (ref 96–108)
CO2 SERPL-SCNC: 23 MMOL/L — SIGNIFICANT CHANGE UP (ref 22–31)
CO2 SERPL-SCNC: 23 MMOL/L — SIGNIFICANT CHANGE UP (ref 22–31)
CREAT SERPL-MCNC: 0.88 MG/DL — SIGNIFICANT CHANGE UP (ref 0.5–1.3)
CREAT SERPL-MCNC: 1 MG/DL — SIGNIFICANT CHANGE UP (ref 0.5–1.3)
EGFR: 100 ML/MIN/1.73M2 — SIGNIFICANT CHANGE UP
EGFR: 87 ML/MIN/1.73M2 — SIGNIFICANT CHANGE UP
GLUCOSE SERPL-MCNC: 104 MG/DL — HIGH (ref 70–99)
GLUCOSE SERPL-MCNC: 99 MG/DL — SIGNIFICANT CHANGE UP (ref 70–99)
HCT VFR BLD CALC: 40.1 % — SIGNIFICANT CHANGE UP (ref 39–50)
HCT VFR BLD CALC: 42.2 % — SIGNIFICANT CHANGE UP (ref 39–50)
HCV AB S/CO SERPL IA: 0.09 S/CO — SIGNIFICANT CHANGE UP (ref 0–0.99)
HCV AB SERPL-IMP: SIGNIFICANT CHANGE UP
HGB BLD-MCNC: 14 G/DL — SIGNIFICANT CHANGE UP (ref 13–17)
HGB BLD-MCNC: 14.6 G/DL — SIGNIFICANT CHANGE UP (ref 13–17)
INR BLD: 1.13 RATIO — SIGNIFICANT CHANGE UP (ref 0.88–1.16)
MCHC RBC-ENTMCNC: 30.9 PG — SIGNIFICANT CHANGE UP (ref 27–34)
MCHC RBC-ENTMCNC: 31.1 PG — SIGNIFICANT CHANGE UP (ref 27–34)
MCHC RBC-ENTMCNC: 34.6 GM/DL — SIGNIFICANT CHANGE UP (ref 32–36)
MCHC RBC-ENTMCNC: 34.9 GM/DL — SIGNIFICANT CHANGE UP (ref 32–36)
MCV RBC AUTO: 89.1 FL — SIGNIFICANT CHANGE UP (ref 80–100)
MCV RBC AUTO: 89.2 FL — SIGNIFICANT CHANGE UP (ref 80–100)
PLATELET # BLD AUTO: 173 K/UL — SIGNIFICANT CHANGE UP (ref 150–400)
PLATELET # BLD AUTO: 209 K/UL — SIGNIFICANT CHANGE UP (ref 150–400)
POTASSIUM SERPL-MCNC: 4.1 MMOL/L — SIGNIFICANT CHANGE UP (ref 3.5–5.3)
POTASSIUM SERPL-MCNC: 4.2 MMOL/L — SIGNIFICANT CHANGE UP (ref 3.5–5.3)
POTASSIUM SERPL-SCNC: 4.1 MMOL/L — SIGNIFICANT CHANGE UP (ref 3.5–5.3)
POTASSIUM SERPL-SCNC: 4.2 MMOL/L — SIGNIFICANT CHANGE UP (ref 3.5–5.3)
PROTHROM AB SERPL-ACNC: 13.1 SEC — SIGNIFICANT CHANGE UP (ref 10.5–13.4)
RBC # BLD: 4.5 M/UL — SIGNIFICANT CHANGE UP (ref 4.2–5.8)
RBC # BLD: 4.73 M/UL — SIGNIFICANT CHANGE UP (ref 4.2–5.8)
RBC # FLD: 12.4 % — SIGNIFICANT CHANGE UP (ref 10.3–14.5)
RBC # FLD: 12.4 % — SIGNIFICANT CHANGE UP (ref 10.3–14.5)
SARS-COV-2 RNA SPEC QL NAA+PROBE: SIGNIFICANT CHANGE UP
SODIUM SERPL-SCNC: 139 MMOL/L — SIGNIFICANT CHANGE UP (ref 135–145)
SODIUM SERPL-SCNC: 141 MMOL/L — SIGNIFICANT CHANGE UP (ref 135–145)
WBC # BLD: 10.81 K/UL — HIGH (ref 3.8–10.5)
WBC # BLD: 9.41 K/UL — SIGNIFICANT CHANGE UP (ref 3.8–10.5)
WBC # FLD AUTO: 10.81 K/UL — HIGH (ref 3.8–10.5)
WBC # FLD AUTO: 9.41 K/UL — SIGNIFICANT CHANGE UP (ref 3.8–10.5)

## 2022-06-12 PROCEDURE — 99232 SBSQ HOSP IP/OBS MODERATE 35: CPT

## 2022-06-12 PROCEDURE — 27236 TREAT THIGH FRACTURE: CPT | Mod: RT

## 2022-06-12 RX ORDER — ONDANSETRON 8 MG/1
4 TABLET, FILM COATED ORAL EVERY 6 HOURS
Refills: 0 | Status: DISCONTINUED | OUTPATIENT
Start: 2022-06-12 | End: 2022-06-13

## 2022-06-12 RX ORDER — HYDROMORPHONE HYDROCHLORIDE 2 MG/ML
0.5 INJECTION INTRAMUSCULAR; INTRAVENOUS; SUBCUTANEOUS
Refills: 0 | Status: DISCONTINUED | OUTPATIENT
Start: 2022-06-12 | End: 2022-06-12

## 2022-06-12 RX ORDER — ACETAMINOPHEN 500 MG
650 TABLET ORAL EVERY 6 HOURS
Refills: 0 | Status: DISCONTINUED | OUTPATIENT
Start: 2022-06-12 | End: 2022-06-12

## 2022-06-12 RX ORDER — ASCORBIC ACID 60 MG
500 TABLET,CHEWABLE ORAL
Refills: 0 | Status: DISCONTINUED | OUTPATIENT
Start: 2022-06-12 | End: 2022-06-13

## 2022-06-12 RX ORDER — ONDANSETRON 8 MG/1
4 TABLET, FILM COATED ORAL ONCE
Refills: 0 | Status: DISCONTINUED | OUTPATIENT
Start: 2022-06-12 | End: 2022-06-12

## 2022-06-12 RX ORDER — FOLIC ACID 0.8 MG
1 TABLET ORAL DAILY
Refills: 0 | Status: DISCONTINUED | OUTPATIENT
Start: 2022-06-12 | End: 2022-06-13

## 2022-06-12 RX ORDER — OXYCODONE HYDROCHLORIDE 5 MG/1
10 TABLET ORAL EVERY 4 HOURS
Refills: 0 | Status: DISCONTINUED | OUTPATIENT
Start: 2022-06-12 | End: 2022-06-13

## 2022-06-12 RX ORDER — PANTOPRAZOLE SODIUM 20 MG/1
40 TABLET, DELAYED RELEASE ORAL
Refills: 0 | Status: DISCONTINUED | OUTPATIENT
Start: 2022-06-12 | End: 2022-06-13

## 2022-06-12 RX ORDER — SODIUM CHLORIDE 9 MG/ML
1000 INJECTION, SOLUTION INTRAVENOUS
Refills: 0 | Status: DISCONTINUED | OUTPATIENT
Start: 2022-06-12 | End: 2022-06-13

## 2022-06-12 RX ORDER — ENOXAPARIN SODIUM 100 MG/ML
40 INJECTION SUBCUTANEOUS EVERY 24 HOURS
Refills: 0 | Status: DISCONTINUED | OUTPATIENT
Start: 2022-06-13 | End: 2022-06-13

## 2022-06-12 RX ORDER — OXYCODONE HYDROCHLORIDE 5 MG/1
5 TABLET ORAL ONCE
Refills: 0 | Status: DISCONTINUED | OUTPATIENT
Start: 2022-06-12 | End: 2022-06-12

## 2022-06-12 RX ORDER — SODIUM CHLORIDE 9 MG/ML
1000 INJECTION, SOLUTION INTRAVENOUS
Refills: 0 | Status: DISCONTINUED | OUTPATIENT
Start: 2022-06-12 | End: 2022-06-12

## 2022-06-12 RX ORDER — CEFAZOLIN SODIUM 1 G
2000 VIAL (EA) INJECTION EVERY 8 HOURS
Refills: 0 | Status: COMPLETED | OUTPATIENT
Start: 2022-06-12 | End: 2022-06-13

## 2022-06-12 RX ORDER — MEPERIDINE HYDROCHLORIDE 50 MG/ML
12.5 INJECTION INTRAMUSCULAR; INTRAVENOUS; SUBCUTANEOUS
Refills: 0 | Status: DISCONTINUED | OUTPATIENT
Start: 2022-06-12 | End: 2022-06-12

## 2022-06-12 RX ORDER — ACETAMINOPHEN 500 MG
975 TABLET ORAL EVERY 8 HOURS
Refills: 0 | Status: DISCONTINUED | OUTPATIENT
Start: 2022-06-12 | End: 2022-06-13

## 2022-06-12 RX ORDER — OXYCODONE HYDROCHLORIDE 5 MG/1
5 TABLET ORAL EVERY 4 HOURS
Refills: 0 | Status: DISCONTINUED | OUTPATIENT
Start: 2022-06-12 | End: 2022-06-13

## 2022-06-12 RX ADMIN — OXYCODONE HYDROCHLORIDE 5 MILLIGRAM(S): 5 TABLET ORAL at 10:21

## 2022-06-12 RX ADMIN — OXYCODONE HYDROCHLORIDE 10 MILLIGRAM(S): 5 TABLET ORAL at 05:35

## 2022-06-12 RX ADMIN — OXYCODONE HYDROCHLORIDE 10 MILLIGRAM(S): 5 TABLET ORAL at 06:10

## 2022-06-12 RX ADMIN — OXYCODONE HYDROCHLORIDE 5 MILLIGRAM(S): 5 TABLET ORAL at 11:36

## 2022-06-12 RX ADMIN — Medication 500 MILLIGRAM(S): at 21:37

## 2022-06-12 RX ADMIN — SODIUM CHLORIDE 75 MILLILITER(S): 9 INJECTION, SOLUTION INTRAVENOUS at 16:56

## 2022-06-12 RX ADMIN — OXYCODONE HYDROCHLORIDE 5 MILLIGRAM(S): 5 TABLET ORAL at 12:16

## 2022-06-12 RX ADMIN — Medication 100 MILLIGRAM(S): at 21:37

## 2022-06-12 RX ADMIN — OXYCODONE HYDROCHLORIDE 10 MILLIGRAM(S): 5 TABLET ORAL at 01:31

## 2022-06-12 RX ADMIN — Medication 1 TABLET(S): at 16:56

## 2022-06-12 RX ADMIN — Medication 1 MILLIGRAM(S): at 16:56

## 2022-06-12 RX ADMIN — OXYCODONE HYDROCHLORIDE 5 MILLIGRAM(S): 5 TABLET ORAL at 11:01

## 2022-06-12 NOTE — DISCHARGE NOTE PROVIDER - NSDCFUADDINST_GEN_ALL_CORE_FT
1.	Pain Control as needed  2.	Partial weight bearing right lower extremity  3.	DVT Prophylaxis per AC team; see med rec  4.	PT as needed  5.	Follow up with Dr. Mckeon as Outpatient in 10-14 Days after Discharge from the Hospital or Rehab. Call Office For Appointment.  6.	Remove Dressing Post-Op Day 10, with Daily Dressing Changes as Need.  7.	Ice affected area as Needed  8.	Keep Dressing  Clean and dry.   Discharge Instructions for Right Hip ORIF:    1. PAIN CONTROL: See Med Rec.  2. ACTIVITY: 50% Partial Weight Bearing with assistance and rolling walker  3. PT: daily  4. DVT/PE PROPHYLAXIS: Continue DVT/PE Prophylaxis. See Med Rec for Duration and dose.  5. BANDAGE: Change dressing to a new Mepilex Ag bandage POD7 (6/19/22). May change sooner if dressing saturated or falling off. DO NOT REMOVE BANDAGE TO CHECK WOUND ON INTAKE.  6. SHOWER: Okay to shower. Do not soak, submerge or let shower stream beat on dressing/wound.  7. STAPLES/SUTURES: There are no staples or sutures to be removed.   8. FOLLOW UP: Follow-up with Orthopedic Surgeon Dr. Jose in 14 Days. Call Office For Appointment.

## 2022-06-12 NOTE — PROGRESS NOTE ADULT - SUBJECTIVE AND OBJECTIVE BOX
Orthopedics Postop Check  POD 1 s/p R Hip FNS    Patient seen and examined at bedside. Pain is controlled. Pt feeling well. No cp sob nausea or vomiting.    Vital Signs Last 24 Hrs  T(C): 36.9 (06-12-22 @ 16:42), Max: 37.1 (06-12-22 @ 15:21)  T(F): 98.4 (06-12-22 @ 16:42), Max: 98.8 (06-12-22 @ 15:21)  HR: 70 (06-12-22 @ 16:42) (69 - 80)  BP: 125/81 (06-12-22 @ 16:42) (102/55 - 139/83)  BP(mean): 98 (06-11-22 @ 19:49) (98 - 98)  RR: 18 (06-12-22 @ 16:42) (12 - 18)  SpO2: 98% (06-12-22 @ 16:42) (92% - 100%)                        14.0   9.41  )-----------( 173      ( 12 Jun 2022 15:42 )             40.1     12 Jun 2022 15:42    139    |  110    |  16     ----------------------------<  104    4.1     |  23     |  1.00     Ca    8.5        12 Jun 2022 15:42    TPro  7.5    /  Alb  4.5    /  TBili  1.2    /  DBili  x      /  AST  24     /  ALT  32     /  AlkPhos  73     11 Jun 2022 20:49    PT/INR - ( 12 Jun 2022 05:04 )   PT: 13.1 sec;   INR: 1.13 ratio         PTT - ( 12 Jun 2022 05:04 )  PTT:30.7 sec    Exam:  Gen: NAD, resting comfortably  Dressing c/d/i  +EHL/FHL/TA/GS  SILT L2-S1  +DP/PT 2+  Calf NTTP b/l  Compartments soft and compressible    A/P:  57yMale Stable POD 0  s/p R Hip FNS    Medical comanagement appreciated  -FU AM labs  -50% Partial Weight Bearing RLE  -Pain control PRN  -PT  -Ppx ABX x24hrs  -DVT PE ppx: Lovenox pending AC team recs - hold until POD1  -Incentive spirometry  Dispo: Pending PT recs but likely home  -Will discuss with attending Dr. white and advise if any changes to plan

## 2022-06-12 NOTE — DISCHARGE NOTE PROVIDER - HOSPITAL COURSE
INCOMPLETE    The patient is a 57 year old male status post Open Reduction Surgical Fixation of a right femoral neck Fracture after being admitted through [facility] Emergency Room. The Patient was medically Optimized for the Previously mentioned surgical procedure. The patient was taken to the operating room on date mentioned above. Prophylactic antibiotics were started before the procedure and continued for 24 hours.  There were no complications during the procedure and patient tolerated the procedure well.  The patient was transferred to recovery room in stable condition and subsequently to surgical floor.  Patient was placed on Lovenox for anticoagulation.  All home medications were continued.  The patient received physical therapy daily and daily labs were followed.  The dressing kept clean, dry, intact.  The rest of the hospital stay was unremarkable

## 2022-06-12 NOTE — PROGRESS NOTE ADULT - SUBJECTIVE AND OBJECTIVE BOX
CC:Patient is a 57y old  Male who presents with a chief complaint of  R hip fracture     HPI:  57y Male [facility]/o severe R hip pain and inability to ambulate.  Patient denies headstrike or LOC. Localizes pain to R hip/femur. Patient denies radiation of pain. Patient denies numbness/tingling/burning in the RLE. No other bone/joint complaints. Patient is a community ambulator at baseline without assistive devices. Patient has no issues w/ ADLs/IADLs. Denies any medical history. Denies history of cancer or metastatic lesions.    pt seen and examined this am. Doing ok. For OR today. NO sob/chest pain. Had stress test many years ago which was negative. gets yearly physical. Exercises regulary.   Pain right hip controlled.   No acute overnight events.     ROS: all 10 systems reviewed and is as above otherwise negative.     Vital Signs Last 24 Hrs  T(C): 36.9 (12 Jun 2022 08:32), Max: 37 (11 Jun 2022 22:30)  T(F): 98.5 (12 Jun 2022 08:32), Max: 98.6 (11 Jun 2022 22:30)  HR: 76 (12 Jun 2022 08:32) (69 - 80)  BP: 118/74 (12 Jun 2022 08:32) (118/74 - 139/83)  BP(mean): 98 (11 Jun 2022 19:49) (98 - 98)  RR: 18 (12 Jun 2022 08:32) (18 - 18)  SpO2: 97% (12 Jun 2022 08:32) (97% - 98%)    PHYSICAL EXAM:    GENERAL: Comfortable, no acute distress   HEAD:  Normocephalic, atraumatic  EYES: EOMI, PERRLA  HEENT: Moist mucous membranes  NECK: Supple, No JVD  NERVOUS SYSTEM:  Alert & Oriented X3, Motor Strength 5/5 B/L upper and lower extremities  CHEST/LUNG: Clear to auscultation bilaterally  HEART: Regular rate and rhythm  ABDOMEN: Soft, Nontender, Nondistended, Bowel sounds present  GENITOURINARY: Voiding, no palpable bladder  EXTREMITIES:   No clubbing, cyanosis, or edema  MUSCULOSKELTAL- Right hip pain with movement.   SKIN-no rash    LABS:                        14.6   10.81 )-----------( 209      ( 12 Jun 2022 05:04 )             42.2     06-12    141  |  110<H>  |  20  ----------------------------<  99  4.2   |  23  |  0.88    Ca    8.9      12 Jun 2022 05:04    TPro  7.5  /  Alb  4.5  /  TBili  1.2  /  DBili  x   /  AST  24  /  ALT  32  /  AlkPhos  73  06-11    PT/INR - ( 12 Jun 2022 05:04 )   PT: 13.1 sec;   INR: 1.13 ratio         PTT - ( 12 Jun 2022 05:04 )  PTT:30.7 sec     LIVER FUNCTIONS - ( 11 Jun 2022 20:49 )  Alb: 4.5 g/dL / Pro: 7.5 gm/dL / ALK PHOS: 73 U/L / ALT: 32 U/L / AST: 24 U/L / GGT: x           Radiology:   CTpelvis: Impacted subcapital fracture of the right femoral neck with mild varus   and posterior angulation.    EKG NSR no acute ST changes     MEDICATIONS  (STANDING):  lactated ringers. 1000 milliLiter(s) (100 mL/Hr) IV Continuous <Continuous>    MEDICATIONS  (PRN):  magnesium hydroxide Suspension 30 milliLiter(s) Oral daily PRN Constipation  metoclopramide Injectable 10 milliGRAM(s) IV Push every 6 hours PRN Nausea and/or Vomiting  oxyCODONE    IR 10 milliGRAM(s) Oral every 4 hours PRN Moderate Pain (4 - 6)  oxyCODONE    IR 5 milliGRAM(s) Oral every 4 hours PRN Mild Pain (1 - 3)    ASSESSMENT AND PLAN:  57m, PMH as above a/w:    1. Mechanical fall/right hip fracture:  -for OR today.   -no medical contraindications for OR at this time.   -pain control with prn oxycodone/add tylenol.  -physical therapy  -incentive spirometry  -bowel regimen.     2. Leukocytosis:  -likely reactive.   -no s/s of infn at this time.     3. DVT px  -start post op.

## 2022-06-12 NOTE — DISCHARGE NOTE PROVIDER - NSDCMRMEDTOKEN_GEN_ALL_CORE_FT
Colace 100 mg oral capsule: 1 cap(s) orally 3 times a day, As Needed MDD:3  omeprazole 40 mg oral delayed release capsule: 1 cap(s) orally once a day MDD:1  oxyCODONE 5 mg oral tablet: 1 tab(s) orally every 6 hours MDD:4  Zofran 4 mg oral tablet: 1 tab(s) orally every 6 hours MDD:4   calcium (as citrate)-vitamin D 250 mg-5 mcg (200 intl units) oral tablet: 1 tab(s) orally once a day MDD:1  Colace 100 mg oral capsule: 1 cap(s) orally 3 times a day, As Needed MDD:3  Ecotrin 325 mg oral delayed release tablet: 1 tab(s) orally 2 times a day for DVT prophylaxis for 30 days. Do not skip doses. MDD:2  omeprazole 40 mg oral delayed release capsule: 1 cap(s) orally once a day MDD:1  oxyCODONE 5 mg oral tablet: 1 tab(s) orally every 6 hours MDD:4  Zofran 4 mg oral tablet: 1 tab(s) orally every 6 hours MDD:4

## 2022-06-12 NOTE — DISCHARGE NOTE PROVIDER - NSDCCPCAREPLAN_GEN_ALL_CORE_FT
PRINCIPAL DISCHARGE DIAGNOSIS  Diagnosis: Hip fracture  Assessment and Plan of Treatment: impacted subcapital femoral neck right

## 2022-06-12 NOTE — DISCHARGE NOTE PROVIDER - CARE PROVIDER_API CALL
Mello Jose (DO)  [address], [address]  [place] [address]  Phone: [phone]  Fax: [phone]  Follow Up Time:

## 2022-06-12 NOTE — PROGRESS NOTE ADULT - SUBJECTIVE AND OBJECTIVE BOX
Patient seen and examined at bedside. No acute overnight events. All questions answered regarding surgery. Denies fever, Chills. Pain controlled.    Vital Signs Last 24 Hrs  T(C): 37 (12 Jun 2022 02:25), Max: 37 (11 Jun 2022 22:30)  T(F): 98.6 (12 Jun 2022 02:25), Max: 98.6 (11 Jun 2022 22:30)  HR: 69 (12 Jun 2022 02:25) (69 - 80)  BP: 130/81 (12 Jun 2022 02:25) (120/78 - 139/83)  BP(mean): 98 (11 Jun 2022 19:49) (98 - 98)  RR: 18 (12 Jun 2022 02:25) (18 - 18)  SpO2: 98% (12 Jun 2022 02:25) (97% - 98%)    PE   RLE:  Skin intact; No ecchymosis/soft tissue swelling  Compartments soft; + TTP about hip. No TTP to knee/leg/ankle/foot   ROM lmited 2/2 pain   Unable to SLR; + Log Roll/Heel Strike  Motor intact GS/TA/FHL/EHL  SILT L2-S1  DP/PT pulses 2+      57y Male with a right nondisplaced femoral neck fracture    - Pain control  - NPO/IVF   - Hold all DVT ppx  - FU AM Labs  - EKG/CXR done  - medical clearance in chart  - Plan for OR for ORIF today with Dr Jose

## 2022-06-12 NOTE — BRIEF OPERATIVE NOTE - NSICDXBRIEFPROCEDURE_GEN_ALL_CORE_FT
PROCEDURES:  Open reduction and internal fixation (ORIF) of injury of right hip with C-arm fluoroscopic guidance 12-Jun-2022 15:17:50 femoral neck system for FN fracture Isac Lo

## 2022-06-13 ENCOUNTER — TRANSCRIPTION ENCOUNTER (OUTPATIENT)
Age: 57
End: 2022-06-13

## 2022-06-13 VITALS
TEMPERATURE: 98 F | OXYGEN SATURATION: 97 % | SYSTOLIC BLOOD PRESSURE: 134 MMHG | DIASTOLIC BLOOD PRESSURE: 72 MMHG | RESPIRATION RATE: 18 BRPM | HEART RATE: 83 BPM

## 2022-06-13 LAB
24R-OH-CALCIDIOL SERPL-MCNC: 37.7 NG/ML — SIGNIFICANT CHANGE UP (ref 30–80)
ANION GAP SERPL CALC-SCNC: 6 MMOL/L — SIGNIFICANT CHANGE UP (ref 5–17)
BUN SERPL-MCNC: 14 MG/DL — SIGNIFICANT CHANGE UP (ref 7–23)
CA-I BLD-SCNC: 1.13 MMOL/L — LOW (ref 1.15–1.33)
CALCIUM SERPL-MCNC: 9.1 MG/DL — SIGNIFICANT CHANGE UP (ref 8.5–10.1)
CHLORIDE SERPL-SCNC: 108 MMOL/L — SIGNIFICANT CHANGE UP (ref 96–108)
CO2 SERPL-SCNC: 25 MMOL/L — SIGNIFICANT CHANGE UP (ref 22–31)
CREAT SERPL-MCNC: 1.12 MG/DL — SIGNIFICANT CHANGE UP (ref 0.5–1.3)
EGFR: 76 ML/MIN/1.73M2 — SIGNIFICANT CHANGE UP
GLUCOSE SERPL-MCNC: 112 MG/DL — HIGH (ref 70–99)
HCT VFR BLD CALC: 42.6 % — SIGNIFICANT CHANGE UP (ref 39–50)
HGB BLD-MCNC: 14.6 G/DL — SIGNIFICANT CHANGE UP (ref 13–17)
MCHC RBC-ENTMCNC: 30.6 PG — SIGNIFICANT CHANGE UP (ref 27–34)
MCHC RBC-ENTMCNC: 34.3 GM/DL — SIGNIFICANT CHANGE UP (ref 32–36)
MCV RBC AUTO: 89.3 FL — SIGNIFICANT CHANGE UP (ref 80–100)
PLATELET # BLD AUTO: 193 K/UL — SIGNIFICANT CHANGE UP (ref 150–400)
POTASSIUM SERPL-MCNC: 4.6 MMOL/L — SIGNIFICANT CHANGE UP (ref 3.5–5.3)
POTASSIUM SERPL-SCNC: 4.6 MMOL/L — SIGNIFICANT CHANGE UP (ref 3.5–5.3)
RBC # BLD: 4.77 M/UL — SIGNIFICANT CHANGE UP (ref 4.2–5.8)
RBC # FLD: 12.4 % — SIGNIFICANT CHANGE UP (ref 10.3–14.5)
SODIUM SERPL-SCNC: 139 MMOL/L — SIGNIFICANT CHANGE UP (ref 135–145)
VIT D25+D1,25 OH+D1,25 PNL SERPL-MCNC: 61 PG/ML — SIGNIFICANT CHANGE UP (ref 19.9–79.3)
WBC # BLD: 11.47 K/UL — HIGH (ref 3.8–10.5)
WBC # FLD AUTO: 11.47 K/UL — HIGH (ref 3.8–10.5)

## 2022-06-13 PROCEDURE — 99253 IP/OBS CNSLTJ NEW/EST LOW 45: CPT

## 2022-06-13 PROCEDURE — 99231 SBSQ HOSP IP/OBS SF/LOW 25: CPT

## 2022-06-13 PROCEDURE — 99232 SBSQ HOSP IP/OBS MODERATE 35: CPT

## 2022-06-13 RX ORDER — OMEPRAZOLE 10 MG/1
1 CAPSULE, DELAYED RELEASE ORAL
Qty: 30 | Refills: 0
Start: 2022-06-13 | End: 2022-07-12

## 2022-06-13 RX ORDER — ONDANSETRON 8 MG/1
1 TABLET, FILM COATED ORAL
Qty: 10 | Refills: 0
Start: 2022-06-13

## 2022-06-13 RX ORDER — DOCUSATE SODIUM 100 MG
1 CAPSULE ORAL
Qty: 60 | Refills: 0
Start: 2022-06-13 | End: 2022-06-19

## 2022-06-13 RX ORDER — OXYCODONE HYDROCHLORIDE 5 MG/1
1 TABLET ORAL
Qty: 20 | Refills: 0
Start: 2022-06-13 | End: 2022-06-17

## 2022-06-13 RX ORDER — ASPIRIN/CALCIUM CARB/MAGNESIUM 324 MG
1 TABLET ORAL
Qty: 60 | Refills: 0
Start: 2022-06-13 | End: 2022-07-12

## 2022-06-13 RX ADMIN — Medication 100 MILLIGRAM(S): at 05:33

## 2022-06-13 RX ADMIN — PANTOPRAZOLE SODIUM 40 MILLIGRAM(S): 20 TABLET, DELAYED RELEASE ORAL at 09:33

## 2022-06-13 RX ADMIN — Medication 975 MILLIGRAM(S): at 06:05

## 2022-06-13 RX ADMIN — ENOXAPARIN SODIUM 40 MILLIGRAM(S): 100 INJECTION SUBCUTANEOUS at 05:33

## 2022-06-13 RX ADMIN — Medication 500 MILLIGRAM(S): at 09:33

## 2022-06-13 RX ADMIN — OXYCODONE HYDROCHLORIDE 5 MILLIGRAM(S): 5 TABLET ORAL at 05:34

## 2022-06-13 RX ADMIN — Medication 1 MILLIGRAM(S): at 09:33

## 2022-06-13 RX ADMIN — Medication 1 TABLET(S): at 09:33

## 2022-06-13 RX ADMIN — OXYCODONE HYDROCHLORIDE 5 MILLIGRAM(S): 5 TABLET ORAL at 12:30

## 2022-06-13 RX ADMIN — Medication 975 MILLIGRAM(S): at 05:34

## 2022-06-13 RX ADMIN — OXYCODONE HYDROCHLORIDE 5 MILLIGRAM(S): 5 TABLET ORAL at 11:35

## 2022-06-13 NOTE — PROGRESS NOTE ADULT - SUBJECTIVE AND OBJECTIVE BOX
CC:R hip fracture     HPI:  57y Male presents to [facility] ED s/p [facility] fall c/o severe R hip pain and inability to ambulate.  Patient denies headstrike or LOC. Localizes pain to R hip/femur. Patient denies radiation of pain. Patient denies numbness/tingling/burning in the RLE. No other bone/joint complaints. Patient is a community ambulator at baseline without assistive devices. Patient has no issues w/ ADLs/IADLs. Denies any medical history. Denies history of cancer or metastatic lesions.    Now s/p IM right hip orif POD#1    pt seen and examined this am. Doing well. ambulated multiple times already. Wants to go home. no sob/chest pain. tolerating po. no difficulty voiding.     ROS: all 10 systems reviewed and is as above otherwise negative.     Vital Signs Last 24 Hrs  T(C): 36.8 (13 Jun 2022 08:49), Max: 37.7 (13 Jun 2022 00:16)  T(F): 98.2 (13 Jun 2022 08:49), Max: 99.9 (13 Jun 2022 00:16)  HR: 83 (13 Jun 2022 08:49) (70 - 83)  BP: 134/72 (13 Jun 2022 08:49) (102/55 - 134/72)  RR: 18 (13 Jun 2022 08:49) (12 - 18)  SpO2: 97% (13 Jun 2022 08:49) (92% - 100%)  PHYSICAL EXAM:    GENERAL: Comfortable, no acute distress   HEAD:  Normocephalic, atraumatic  EYES: EOMI, PERRLA  HEENT: Moist mucous membranes  NECK: Supple, No JVD  NERVOUS SYSTEM:  Alert & Oriented X3, Motor Strength 5/5 B/L upper and lower extremities  CHEST/LUNG: Clear to auscultation bilaterally  HEART: Regular rate and rhythm  ABDOMEN: Soft, Nontender, Nondistended, Bowel sounds present  GENITOURINARY: Voiding, no palpable bladder  EXTREMITIES:   No clubbing, cyanosis, or edema  MUSCULOSKELETAL- Right hip dressing intact.  SKIN-no rash    LABS:                        14.6   11.47 )-----------( 193      ( 13 Jun 2022 07:32 )             42.6     06-13    139  |  108  |  14  ----------------------------<  112<H>  4.6   |  25  |  1.12    Ca    9.1      13 Jun 2022 07:32    TPro  7.5  /  Alb  4.5  /  TBili  1.2  /  DBili  x   /  AST  24  /  ALT  32  /  AlkPhos  73  06-11    PT/INR - ( 12 Jun 2022 05:04 )   PT: 13.1 sec;   INR: 1.13 ratio         PTT - ( 12 Jun 2022 05:04 )  PTT:30.7 sec        Radiology:   CTpelvis: Impacted subcapital fracture of the right femoral neck with mild varus   and posterior angulation.    EKG NSR no acute ST changes     MEDICATIONS  (STANDING):  ascorbic acid 500 milliGRAM(s) Oral two times a day  enoxaparin Injectable 40 milliGRAM(s) SubCutaneous every 24 hours  folic acid 1 milliGRAM(s) Oral daily  lactated ringers. 1000 milliLiter(s) (100 mL/Hr) IV Continuous <Continuous>  lactated ringers. 1000 milliLiter(s) (75 mL/Hr) IV Continuous <Continuous>  multivitamin 1 Tablet(s) Oral daily  pantoprazole    Tablet 40 milliGRAM(s) Oral before breakfast    MEDICATIONS  (PRN):  acetaminophen     Tablet .. 975 milliGRAM(s) Oral every 8 hours PRN Temp greater or equal to 38C (100.4F), Mild Pain (1 - 3)  magnesium hydroxide Suspension 30 milliLiter(s) Oral daily PRN Constipation  metoclopramide Injectable 10 milliGRAM(s) IV Push every 6 hours PRN Nausea and/or Vomiting  ondansetron Injectable 4 milliGRAM(s) IV Push every 6 hours PRN Nausea and/or Vomiting  oxyCODONE    IR 5 milliGRAM(s) Oral every 4 hours PRN Mild Pain (1 - 3)  oxyCODONE    IR 10 milliGRAM(s) Oral every 4 hours PRN Severe Pain (7 - 10)  oxyCODONE    IR 5 milliGRAM(s) Oral every 4 hours PRN Moderate Pain (4 - 6)      ASSESSMENT AND PLAN:  57m, PMH as above a/w:    1. Mechanical fall/right hip fracture:  -s/p ORIF POD#1  -pain control with prn oxycodone/ tylenol.  -physical therapy  -incentive spirometry  -bowel regimen.     2. Leukocytosis:  -likely reactive.   -no s/s of infn at this time.     3. DVT px  -per a/c services.

## 2022-06-13 NOTE — PHYSICAL THERAPY INITIAL EVALUATION ADULT - PERTINENT HX OF CURRENT PROBLEM, REHAB EVAL
57y Male presents to [facility] ED s/p [facility] fall c/o severe R hip pain and inability to ambulate. Patient is a community ambulator at baseline without assistive devices. Patient has no issues w/ ADLs/IADLs.

## 2022-06-13 NOTE — PROGRESS NOTE ADULT - SUBJECTIVE AND OBJECTIVE BOX
Orthopedics     POD 1 s/p Right Hip FNS ORIF for fracture  Pain is controlled. Pt feeling well. No nausea or vomiting. Has been OOB to bathroom w walker. Reviewed xrays as per pt request.    Vital Signs Last 24 Hrs  T(C): 36.8 (06-13-22 @ 08:49), Max: 37.7 (06-13-22 @ 00:16)  T(F): 98.2 (06-13-22 @ 08:49), Max: 99.9 (06-13-22 @ 00:16)  HR: 83 (06-13-22 @ 08:49) (70 - 83)  BP: 134/72 (06-13-22 @ 08:49) (102/55 - 134/72)  BP(mean): --  RR: 18 (06-13-22 @ 08:49) (12 - 18)  SpO2: 97% (06-13-22 @ 08:49) (92% - 100%)                        14.6   11.47 )-----------( 193      ( 13 Jun 2022 07:32 )             42.6     13 Jun 2022 07:32    139    |  108    |  14     ----------------------------<  112    4.6     |  25     |  1.12     Ca    9.1        13 Jun 2022 07:32    TPro  7.5    /  Alb  4.5    /  TBili  1.2    /  DBili  x      /  AST  24     /  ALT  32     /  AlkPhos  73     11 Jun 2022 20:49    PT/INR - ( 12 Jun 2022 05:04 )   PT: 13.1 sec;   INR: 1.13 ratio         PTT - ( 12 Jun 2022 05:04 )  PTT:30.7 sec    Exam:  NAD AAOx3  Dressing clean and dry  +EHL FHL TA GS  SILT toes 1-5  +DP  Calf Soft NT    A/P:  POD 1 s/p Right Hip FNS ORIF for fracture  -Analgesia  -DVT PE ppx  -OOB PT   -50% PWB  -OK to DC when ready after PT

## 2022-06-13 NOTE — CONSULT NOTE ADULT - ASSESSMENT
This is a 57 year old male s/p mechanical fall causing fx of right femur, s/p left femur ORIF on 6-.  Pt has moderate thrombosis risk and requires prophylactic anticoagulation.      Discussed the risks vs benefits of full dose aspirin therapy with patient. Agrees with treatment and understands the necessity of therapy.    Plan:  ::Enteric coated ASA 325mg PO BID x 30 days last dose on 7-  ::Daily CBC/BMP  ::Venodynes  ::Enc ambulation    Thank you for this consult, w will sign off.

## 2022-06-13 NOTE — DISCHARGE NOTE NURSING/CASE MANAGEMENT/SOCIAL WORK - PATIENT PORTAL LINK FT
You can access the FollowMyHealth Patient Portal offered by [facility] by registering at the following website: [email or web address]. By joining [employer]’s FollowMyHealth portal, you will also be able to view your health information using other applications (apps) compatible with our system.

## 2022-06-13 NOTE — CONSULT NOTE ADULT - SUBJECTIVE AND OBJECTIVE BOX
HPI:  57y Male presents to [facility] ED s/p [facility] fall c/o severe R hip pain and inability to ambulate.  Patient denies headstrike or LOC. Localizes pain to R hip/femur. Patient denies radiation of pain. Patient denies numbness/tingling/burning in the RLE. No other bone/joint complaints. Patient is a community ambulator at baseline without assistive devices. Patient has no issues w/ ADLs/IADLs. Denies any medical history. Denies history of cancer or metastatic lesions.    PAST MEDICAL & SURGICAL HISTORY:  No pertinent past medical history      No significant past surgical history        MEDICATIONS  (STANDING):  lactated ringers. 1000 milliLiter(s) (100 mL/Hr) IV Continuous <Continuous>    MEDICATIONS  (PRN):    Allergies    No Known Allergies    Intolerances        T(C): 36.9 (22 @ 19:49), Max: 36.9 (22 @ 19:49)  HR: 80 (22 @ 19:49) (80 - 80)  BP: 139/83 (22 @ 19:49) (139/83 - 139/83)  RR: 18 (22 @ 19:49) (18 - 18)  SpO2: 98% (22 @ 19:49) (98% - 98%)  Wt(kg): --    PE   RLE:  Skin intact; No ecchymosis/soft tissue swelling  Compartments soft; + TTP about hip. No TTP to knee/leg/ankle/foot   ROM lmited 2/2 pain   Unable to SLR; + Log Roll/Heel Strike  Motor intact GS/TA/FHL/EHL  SILT L2-S1  DP/PT pulses 2+    LLE/BUE:   No bony TTP; Good ROM w/o pain; Exam Unremarkable    Imaging:  XR demonstrating R nondisplaced femoral neck fracture    57y Male with a right nondisplaced femoral neck fracture  - Pain control  - NPO/IVF after midnight   - FU AM Labs  - EKG/CXR  - Please document Medical clearance  - Plan for OR for ORIF tomorrow AM (2022 21:08)      Patient is a 57y old  Male who presents with a chief complaint of R hip fracture (2022 22:48)      Consulted by            for VTE prophylaxis, risk stratification, and anticoagulation management.    PAST MEDICAL & SURGICAL HISTORY:  No pertinent past medical history      No significant past surgical history          FAMILY HISTORY:      Interval Note:  2022 Pt seen at bedside on 2north oob in chair.  Discussed the use of Aspirin 325mg enteric coated one tablet twice a day 3-4 weeks depending on his mobility. Pt v/u the need for ac.      CAPRINI SCORE  AGE RELATED RISK FACTORS                                                       MOBILITY RELATED FACTORS  [x ] Age 41-60 years                                            (1 Point)                  [ ] Bed rest                                                        (1 Point)  [ ] Age: 61-74 years                                           (2 Points)                [ ] Plaster cast                                                   (2 Points)  [ ] Age= 75 years                                              (3 Points)                 [ ] Bed bound for more than 72 hours                   (2 Points)    DISEASE RELATED RISK FACTORS                                               GENDER SPECIFIC FACTORS  [ ] Edema in the lower extremities                       (1 Point)           [ ] Pregnancy                                                            (1 Point)  [ ] Varicose veins                                               (1 Point)                  [ ] Post-partum < 6 weeks                                      (1 Point)             [x ] BMI > 25 Kg/m2                                            (1 Point)                  [ ] Hormonal therapy or oral contraception       (1 Point)                 [ ] Sepsis (in the previous month)                        (1 Point)             [ ] History of pregnancy complications                (1Point)  [ ] Pneumonia or serious lung disease                                             [ ] Unexplained or recurrent  (=/>3), premature                                 (In the previous month)                               (1 Point)                birth with toxemia or growth-restricted infant (1 Point)  [ ] Abnormal pulmonary function test            (1 Point)                                   SURGERY RELATED RISK FACTORS  [ ] Acute myocardial infarction                       (1 Point)                  [ ]  Section                                         (1 Point)  [ ] Congestive heart failure (in the previous month) (1 Point)   [ ] Minor surgery   lasting <45 minutes       (1 Point)   [ ] Inflammatory bowel disease                             (1 Point)          [ ] Arthroscopic surgery                                  (2 Points)  [ ] Central venous access                                    (2 Points)            [ ] General surgery lasting >45 minutes      (2 Points)       [ ] Stroke (in the previous month)                  (5 Points)            [ ] Elective major lower extremity arthroplasty (5 Points)                                   [  ] Malignancy (present or past include skin melanoma                                          but exclude  basal skin cell)    (2 points)                                      TRAUMA RELATED RISK FACTORS                HEMATOLOGY RELATED FACTORS                                  [ x] Fracture of the hip, pelvis, or leg                       (5 Points)  [ ] Prior episodes of VTE                                     (3 Points)          [ ] Acute spinal cord injury (in the previous month)  (5 Points)  [ ] Positive family history for VTE                         (3 Points)       [ ] Paralysis (less than 1 month)                          (5 Points)  [ ] Prothrombin  A                                      (3 Points)         [ ] Multiple Trauma (within 1month)                 (5Points)                                                                                                                                                                [ ] Factor V Leiden                                          (3 Points)                                OTHER RISK FACTORS                          [ ] Lupus anticoagulants                                     (3 Points)                       [ ] BMI > 40                          (1 Point)                                                         [ ] Anticardiolipin antibodies                                (3 Points)                   [ ] Smoking                              (1Point)                                                [ ] High homocysteine in the blood                      (3 Points)                [  ] Diabetes requiring insulin (1point)                         [ ] Other congenital or acquired thrombophilia       (3 Points)          [  ] Chemotherapy                   (1 Point)  [ ] Heparin induced thrombocytopenia                  (3 Points)             [  ] Blood Transfusion                (1 point)                                                                                                             Total Score [ 7  ]                                                                                                                     IMPROVE Bleeding Risk Score: 2.5    Falls Risk:   High (x  )  Mod (  )  Low (  )  crcl:  86.5       cr:  1.12        BMI: 27.0            EBL: 20  ml  Time procedure    : starts: 14:27          :ends:15:09        Denies any personal or familial history of clotting or bleeding disorders.    Allergies    No Known Allergies    Intolerances        REVIEW OF SYSTEMS    (  )Fever	     (  )Constipation	(  )SOB				(  )Headache	(  )Dysuria  (  )Chills	     (  )Melena	(  )Dyspnea present on exertion	                    (  )Dizziness                    (  )Polyuria  (  )Nausea	     (  )Hematochezia	(  )Cough			                    (  )Syncope   	(  )Hematuria  (  )Vomiting    (  )Chest Pain	(  )Wheezing			(  )Weakness  (  )Diarrhea     (  )Palpitations	(  )Anorexia			(  )Myalgia   (x) hip pain    Pertinent positives in HPI and daily subjective.  All other ROS negative.      Vital Signs Last 24 Hrs  T(C): 36.8 (2022 08:49), Max: 37.7 (2022 00:16)  T(F): 98.2 (2022 08:49), Max: 99.9 (2022 00:16)  HR: 83 (2022 08:49) (70 - 83)  BP: 134/72 (2022 08:49) (102/55 - 134/72)  BP(mean): --  RR: 18 (2022 08:49) (12 - 18)  SpO2: 97% (2022 08:49) (92% - 100%)    PHYSICAL EXAM:    Constitutional: Appears Well    Neurological: A& O x 3    Skin: Warm    Respiratory and Thorax: normal effort; Breath sounds: normal; No rales/wheezing/rhonchi  	  Cardiovascular: S1, S2, regular, NMBR	    Gastrointestinal: BS + x 4Q, nontender	    Genitourinary:  Bladder nondistended, nontender    Musculoskeletal:   General Right:   no muscle/joint tenderness,   normal tone, no joint swelling,   ROM: limited	    General Left:   no muscle/joint tenderness,   normal tone, no joint swelling,   ROM: full    Hip:  Right: Dressing CDI;    Lower extrems:   Right: no calf tenderness              negative saumya's sign               + pedal pulses    Left:   no calf tenderness              negative saumya's sign               + pedal pulses                          14.6   11.47 )-----------( 193      ( 2022 07:32 )             42.6       06-13    139  |  108  |  14  ----------------------------<  112<H>  4.6   |  25  |  1.12    Ca    9.1      2022 07:32    TPro  7.5  /  Alb  4.5  /  TBili  1.2  /  DBili  x   /  AST  24  /  ALT  32  /  AlkPhos  73  06-11      PT/INR - ( 2022 05:04 )   PT: 13.1 sec;   INR: 1.13 ratio         PTT - ( 2022 05:04 )  PTT:30.7 sec				    MEDICATIONS  (STANDING):  ascorbic acid 500 milliGRAM(s) Oral two times a day  enoxaparin Injectable 40 milliGRAM(s) SubCutaneous every 24 hours  folic acid 1 milliGRAM(s) Oral daily  lactated ringers. 1000 milliLiter(s) IV Continuous <Continuous>  lactated ringers. 1000 milliLiter(s) IV Continuous <Continuous>  multivitamin 1 Tablet(s) Oral daily  pantoprazole    Tablet 40 milliGRAM(s) Oral before breakfast          DVT Prophylaxis:  LMWH                   ( x )  Heparin SQ           (  )  Coumadin             (  )  Xarelto                  (  )  Eliquis                   (  )  Venodynes           (x  )  Ambulation          ( x )  UFH                       (  )  Contraindicated  (  )  EC Aspirin             (  )

## 2022-06-13 NOTE — PHYSICAL THERAPY INITIAL EVALUATION ADULT - GENERAL OBSERVATIONS, REHAB EVAL
The pt was pleasant and cooperative, received on 2N, supine and eager to participate in PT evaluation.

## 2022-06-13 NOTE — PROGRESS NOTE ADULT - SUBJECTIVE AND OBJECTIVE BOX
Orthopedics Postop Check  POD 1 s/p R Hip FNS    Patient seen and examined at bedside. Pain is controlled. Pt feeling well. No cp sob nausea or vomiting. Patient reports that he already walked down the hallway with walker and RN assistance this morning.     Vital Signs Last 24 Hrs  T(C): 37.6 (13 Jun 2022 05:07), Max: 37.7 (13 Jun 2022 00:16)  T(F): 99.6 (13 Jun 2022 05:07), Max: 99.9 (13 Jun 2022 00:16)  HR: 81 (13 Jun 2022 05:07) (70 - 81)  BP: 129/80 (13 Jun 2022 05:07) (102/55 - 132/84)  BP(mean): --  RR: 18 (13 Jun 2022 05:07) (12 - 18)  SpO2: 96% (13 Jun 2022 05:07) (92% - 100%)                          14.0   9.41  )-----------( 173      ( 12 Jun 2022 15:42 )             40.1       Exam:  Gen: NAD, resting comfortably  Dressing c/d/i  +EHL/FHL/TA/GS  SILT L2-S1  +DP/PT 2+  Calf NTTP b/l  Compartments soft and compressible    A/P:  57yMale Stable POD 1  s/p R Hip FNS    Medical comanagement appreciated  -FU AM labs  -50% Partial Weight Bearing RLE  -Pain control PRN  -PT  -Ppx ABX x24hrs  -DVT PE ppx: Lovenox pending AC team recs  -Incentive spirometry  Dispo: Pending PT recs but likely home  -Will discuss with attending Dr. Jose and advise if any changes to plan

## 2022-06-20 DIAGNOSIS — D72.829 ELEVATED WHITE BLOOD CELL COUNT, UNSPECIFIED: ICD-10-CM
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
## 2022-06-20 DIAGNOSIS — S72.011A UNSPECIFIED INTRACAPSULAR FRACTURE OF RIGHT FEMUR, INITIAL ENCOUNTER FOR CLOSED FRACTURE: ICD-10-CM
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
## 2022-06-23 ENCOUNTER — APPOINTMENT (OUTPATIENT)
Dept: ORTHOPEDIC SURGERY | Facility: CLINIC | Age: 57
End: 2022-06-23

## 2022-06-23 ENCOUNTER — APPOINTMENT (OUTPATIENT)
Dept: ORTHOPEDIC SURGERY | Facility: CLINIC | Age: 57
End: 2022-06-23
Payer: COMMERCIAL

## 2022-06-23 VITALS
SYSTOLIC BLOOD PRESSURE: 138 MMHG | DIASTOLIC BLOOD PRESSURE: 83 MMHG | BODY MASS INDEX: 26.62 KG/M2 | HEIGHT: 70.5 IN | HEART RATE: 76 BPM | WEIGHT: 188 LBS

## 2022-06-23 PROCEDURE — 73502 X-RAY EXAM HIP UNI 2-3 VIEWS: CPT

## 2022-06-23 PROCEDURE — 99024 POSTOP FOLLOW-UP VISIT: CPT

## 2022-06-23 NOTE — HISTORY OF PRESENT ILLNESS
[Chills] : no chills [Fever] : no fever [Nausea] : no nausea [Vomiting] : no vomiting [Discharge] : absent of discharge [Dehiscence] : not dehisced [de-identified] : SPO ORIF of Right (R) hip. DOS: 6/12/22 [de-identified] : YOKASTA SY is a 57 year y/o male who presents for SPO ORIF of Right (R) hip, 11 days ago. He denies fever or chills, redness around or near the incision site(s), numbness/tingling. He denies nausea/ vomiting and admits to their appetite since their surgery being back to normal. Normal bowel habits at this time. Patient has not started physical therapy. Patient presents today FWB. He reports pain while walking at this time.  [de-identified] : Right (R) hip \par \par Incision sites are clean dry and intact. Dressing was removed today. No surrounding erythema. No drainage.  Range of motion 0-50° with no pain. Motor and sensation are intact distally.  He does not have numbness over the pudendal area.  [de-identified] : 2 views were performed today in the office. They demonstrate adequate placement of hardware with no displacement relative to contralateral side, no deformity, no dislocation, no bony lesions.\par   [de-identified] : YOKASTA SY is a 57 year y/o male who presents for SPO ORIF of Right (R) hip, 11 days ago. Surgical sites are clean, dry and intact at this time. He will follow up in 4 wks for repeat clinical assessment and radiographs. He will avoid swimming or submerging wound in water for another 4-5 wks. He has pain walking because intra-operative we had to cut through IT band to access femoral neck. All questions were answered and the patient verbalized understanding. The patient is in agreement with this treatment plan.

## 2022-06-23 NOTE — ADDENDUM
[FreeTextEntry1] : Documented by Armando Grande acting as a scribe for Dr. Jose and Nicko Peoples PA-C on 06/23/2022. \par \par All medical record entries made by the Scribe were at my, Dr. Jose, and Nicko Peoples's, direction and\par personally dictated by me on 06/23/2022. I have reviewed the chart and agree that the record\par accurately reflects my personal performance of the history, physical exam, procedure and imaging.

## 2022-07-28 ENCOUNTER — APPOINTMENT (OUTPATIENT)
Dept: ORTHOPEDIC SURGERY | Facility: CLINIC | Age: 57
End: 2022-07-28

## 2022-07-28 PROCEDURE — 99024 POSTOP FOLLOW-UP VISIT: CPT

## 2022-07-28 PROCEDURE — 73502 X-RAY EXAM HIP UNI 2-3 VIEWS: CPT | Mod: RT

## 2022-07-28 NOTE — HISTORY OF PRESENT ILLNESS
[3] : the patient reports pain that is 3/10 in severity [Clean/Dry/Intact] : clean, dry and intact [Neuro Intact] : an unremarkable neurological exam [Vascular Intact] : ~T peripheral vascular exam normal [Xray (Date:___)] : [unfilled] Xray -  [Doing Well] : is doing well [Excellent Pain Control] : has excellent pain control [___ Weeks Post Op] : [unfilled] weeks post op [Chills] : no chills [Fever] : no fever [Nausea] : no nausea [Vomiting] : no vomiting [Discharge] : absent of discharge [Dehiscence] : not dehisced [de-identified] : SPO ORIF of Right (R) hip. DOS: 6/12/22 [de-identified] : YOKASTA SY is a 57 year y/o male who presents for SPO ORIF of Right (R) hip with FNS 8 weeks ago he denies fever or chills, redness around or near the incision site(s), numbness/tingling. He denies nausea/ vomiting and admits to their appetite since their surgery being back to normal.  The patient has been performing all of his normal ADLs.  He is fishing walking and exercising.  He has no complaints of pain in his hip.  He has occasional soreness with activity.  He is not attending any therapy. [de-identified] : Right (R) hip \par \par Incision sites are clean dry and intact. No surrounding erythema. No drainage.  Range of motion 0-90° with no pain with internal and external rotation. Motor and sensation are intact distally.  He does not have numbness over the pudendal area.  His calf and thigh are soft and nontender.  He has excellent strength with straight leg raising.  He is grossly neurovascular intact distally. [de-identified] : 2 views were performed today in the office. They demonstrate adequate placement of hardware with no displacement relative to contralateral side, no deformity, no dislocation, no bony lesions.\par   [de-identified] : YOKASTA SY is a 57 year y/o male who presents for SPO ORIF of Right (R) hip,\par \par I had a discussion with the patient regarding his condition.  I reviewed his x-rays in the office.  He can continue weightbearing as tolerated.  I advised him to avoid running and jumping.  He can perform his other activities as tolerated.  He is doing exceptionally well.  He will follow-up in 6 to 8 weeks for repeat evaluation and x-rays.  All questions were answered.

## 2022-09-29 ENCOUNTER — APPOINTMENT (OUTPATIENT)
Dept: ORTHOPEDIC SURGERY | Facility: CLINIC | Age: 57
End: 2022-09-29

## 2022-09-29 DIAGNOSIS — Z98.890 OTHER SPECIFIED POSTPROCEDURAL STATES: ICD-10-CM

## 2022-09-29 DIAGNOSIS — Z87.81 PERSONAL HISTORY OF (HEALED) TRAUMATIC FRACTURE: ICD-10-CM

## 2022-09-29 PROCEDURE — 99213 OFFICE O/P EST LOW 20 MIN: CPT

## 2022-09-29 PROCEDURE — 73502 X-RAY EXAM HIP UNI 2-3 VIEWS: CPT

## 2022-09-30 PROBLEM — Z87.81 S/P RIGHT HIP FRACTURE: Status: ACTIVE | Noted: 2022-06-23

## 2022-09-30 PROBLEM — Z98.890 STATUS POST HIP SURGERY: Status: ACTIVE | Noted: 2022-06-23

## 2022-09-30 NOTE — HISTORY OF PRESENT ILLNESS
[___ mths] : [unfilled] month(s) ago [Improving] : improving [0] : an average pain level of 0/10 [de-identified] : YOKASTA SY is a 57 year y/o male who presents for f/u visit 3 months SPO ORIF of Right (R) hip. DOS: 6/12/22. He is doing very well at this time. Denies any pain. However, he complains of occasional soreness of the hip after waking up.

## 2022-09-30 NOTE — ADDENDUM
[FreeTextEntry1] : Documented by Rachel Serrano acting as a scribe for Dr. Jose and Nicko Peoples PA-C on 09/29/2022. \par \par All medical record entries made by the Scribe were at my, Dr. Jose, and Nicko Peoples's, direction and\par personally dictated by me on 09/29/2022. I have reviewed the chart and agree that the record\par accurately reflects my personal performance of the history, physical exam, procedure and imaging.

## 2022-09-30 NOTE — DISCUSSION/SUMMARY
[de-identified] : YOKASTA SY is a 57 year y/o male who presents for f/u visit 3 months SPO ORIF of Right (R) hip. DOS: 6/12/22. He is doing very well at this time. Denies any pain. However, he complains of occasional soreness of the hip after waking up. \par \par At this point, patient ROM has greatly improved, and his pain has subsided, he is doing well and happy with his progress so far. I recommend that patient obtains OTC Voltaren gel and apply BID to respective area for soreness as needed. Patient will follow up p.r.n. All questions were answered and the patient verbalized understanding. The patient is in agreement with this treatment plan.

## 2022-09-30 NOTE — PHYSICAL EXAM
[de-identified] : Physical Exam:\par General: Well appearing, no acute distress\par Neurologic: A&Ox3, No focal deficits\par Head: NCAT without abrasions, lacerations, or ecchymosis to head, face, or scalp\par Eyes: No scleral icterus, no gross abnormalities\par Respiratory: Equal chest wall expansion bilaterally, no accessory muscle use\par Lymphatic: No lymphadenopathy palpated\par Skin: Warm and dry\par Psychiatric: Normal mood and affect \par \par \par Right Hip:\par Wound is healed. Incisions are clean, dry, and intact. No drainage. Range of motion 0-90° with no pain with internal and external rotation. Motor and sensation are intact distally. He does not have numbness over the pudendal area. His calf and thigh are soft and nontender. He has excellent strength with straight leg raising. He is grossly neurovascular intact distally.  [de-identified] : 2 views of the right hip were performed today in the office. They demonstrate adequate placement of hardware with no displacement relative to contralateral side, no deformity, no dislocation, no bony lesions.\par

## 2022-12-22 ENCOUNTER — TRANSCRIPTION ENCOUNTER (OUTPATIENT)
Age: 57
End: 2022-12-22

## 2023-01-30 NOTE — H&P ADULT - NSHPRISKHIVSCREEN_GEN_ALL_CORE
Health Maintenance Due   Topic Date Due   • Shingles Vaccine (3 of 3) 11/30/2020   • Diabetes Eye Exam  09/28/2021   • Diabetes Foot Exam  10/06/2022   • Depression Screening  10/06/2022   • DTaP/Tdap/Td Vaccine (2 - Td or Tdap) 11/06/2022       Patient is due for topics as listed above but is not proceeding with Immunization(s) Dtap/Tdap/Td and Shingles at this time.    Offered and patient declined

## 2023-01-31 ENCOUNTER — NON-APPOINTMENT (OUTPATIENT)
Age: 58
End: 2023-01-31

## 2023-01-31 ENCOUNTER — APPOINTMENT (OUTPATIENT)
Dept: INTERNAL MEDICINE | Facility: CLINIC | Age: 58
End: 2023-01-31
Payer: COMMERCIAL

## 2023-01-31 VITALS
HEIGHT: 70.5 IN | SYSTOLIC BLOOD PRESSURE: 150 MMHG | HEART RATE: 77 BPM | WEIGHT: 194 LBS | RESPIRATION RATE: 14 BRPM | DIASTOLIC BLOOD PRESSURE: 89 MMHG | TEMPERATURE: 97.8 F | OXYGEN SATURATION: 98 % | BODY MASS INDEX: 27.46 KG/M2

## 2023-01-31 DIAGNOSIS — Z13.39 ENCOUNTER FOR SCREENING EXAM FOR OTHER MENTAL HEALTH AND BEHAVIORAL DISORDERS: ICD-10-CM

## 2023-01-31 DIAGNOSIS — Z00.00 ENCOUNTER FOR GENERAL ADULT MEDICAL EXAMINATION W/OUT ABNORMAL FINDINGS: ICD-10-CM

## 2023-01-31 DIAGNOSIS — R03.0 ELEVATED BLOOD-PRESSURE READING, W/OUT DIAGNOSIS OF HYPERTENSION: ICD-10-CM

## 2023-01-31 DIAGNOSIS — Z12.5 ENCOUNTER FOR SCREENING FOR MALIGNANT NEOPLASM OF PROSTATE: ICD-10-CM

## 2023-01-31 DIAGNOSIS — R53.82 CHRONIC FATIGUE, UNSPECIFIED: ICD-10-CM

## 2023-01-31 DIAGNOSIS — Z13.31 ENCOUNTER FOR SCREENING FOR DEPRESSION: ICD-10-CM

## 2023-01-31 PROCEDURE — G0444 DEPRESSION SCREEN ANNUAL: CPT | Mod: 59

## 2023-01-31 PROCEDURE — G0442 ANNUAL ALCOHOL SCREEN 15 MIN: CPT

## 2023-01-31 PROCEDURE — 93000 ELECTROCARDIOGRAM COMPLETE: CPT | Mod: 59

## 2023-01-31 PROCEDURE — 99396 PREV VISIT EST AGE 40-64: CPT | Mod: 25

## 2023-01-31 NOTE — ASSESSMENT
[FreeTextEntry1] : Annual:\par pt. had labs done at work this month , I reviewed  and discussed with pt.\par Ordered PSA ( screening for prostate CA), screen for  thyroid disorder.\par ordered testosterone level as pt. requested.\par labs drawn in the office.\par will review the results and address if abnormal.\par Deferred flu vaccine\par deferred  tdap, advised pt. to get  shingles vaccine.\par  Received  COVID vaccine( moderna).\par Declined for HIV and Hep C  screening test.\par alcohol screening :SIBRT:2\par Depression screening:PHQ2:0\par up to date with colonoscopy , result  normal\par EKG: NSR , no ST-T wave changes\par \par Elevated BP: \par pt. reported that he BP stays with in normal range at home , he have a machine at home. He thinks its probably white coat syndrome. \par Advised DASH diet, home monitoring of BP.\par If BP reading stays high multiple days , recommended for a f/u appt.

## 2023-01-31 NOTE — HISTORY OF PRESENT ILLNESS
[FreeTextEntry1] : wellness exam.\par  [de-identified] : Mr. YOKASTA SY  is    57 year male . pt.have no known medical history.\par Pt. is over all feeling well.\par have gained 7 lb , stated he eats healthy and exercise regularly.\par have seen urology for elevated PSA, stated the repeat labs was normal.\par had a right hip fracture last yr, had surgery , he is better now.\par No change is bowel and bladder habit.\par No trouble sleeping at night.\par Not on pain.\par \par \par

## 2023-01-31 NOTE — HEALTH RISK ASSESSMENT
[Excellent] : ~his/her~  mood as  excellent [No] : In the past 12 months have you used drugs other than those required for medical reasons? No [No falls in past year] : Patient reported no falls in the past year [0] : 2) Feeling down, depressed, or hopeless: Not at all (0) [Patient reported colonoscopy was normal] : Patient reported colonoscopy was normal [HIV test declined] : HIV test declined [Hepatitis C test declined] : Hepatitis C test declined [None] : None [With Family] : lives with family [Employed] : employed [Sexually Active] : sexually active [Feels Safe at Home] : Feels safe at home [Fully functional (bathing, dressing, toileting, transferring, walking, feeding)] : Fully functional (bathing, dressing, toileting, transferring, walking, feeding) [Reports changes in dental health] : Reports changes in dental health [Smoke Detector] : smoke detector [Carbon Monoxide Detector] : carbon monoxide detector [Seat Belt] :  uses seat belt [Never] : Never [PHQ-2 Negative - No further assessment needed] : PHQ-2 Negative - No further assessment needed [Patient/Caregiver not ready to engage] : , patient/caregiver not ready to engage [de-identified] : very active , does exercise. 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

## 2023-02-01 ENCOUNTER — TRANSCRIPTION ENCOUNTER (OUTPATIENT)
Age: 58
End: 2023-02-01

## 2023-02-01 LAB
PSA SERPL-MCNC: 4.12 NG/ML
TSH SERPL-ACNC: 3.46 UIU/ML

## 2023-02-02 ENCOUNTER — TRANSCRIPTION ENCOUNTER (OUTPATIENT)
Age: 58
End: 2023-02-02

## 2023-02-06 LAB
TESTOST FREE SERPL-MCNC: 21.8 PG/ML
TESTOST SERPL-MCNC: 400 NG/DL

## 2023-02-27 ENCOUNTER — FORM ENCOUNTER (OUTPATIENT)
Age: 58
End: 2023-02-27

## 2024-01-05 NOTE — DISCHARGE NOTE NURSING/CASE MANAGEMENT/SOCIAL WORK - NSDCVIVACCINE_GEN_ALL_CORE_FT
Clinic Care Coordination Contact  [facility]    Clinical Data: Care Coordinator Outreach    Outreach Documentation Number of Outreach Attempt   11/29/2023  11:16 AM 1   12/13/2023  12:25 PM 2   1/5/2024  11:28 AM 2     CCSW spoke to pt who stated she just got out of surgery and handed the phone to her son. CCSW relayed why CCSW was calling and he asked to call back next week. Outreach scheduled for 1/12    MICHAEL Bahena, NEDA  Social Work Care Coordinator      Addendum: 1/8 Pt's called and stated her son is helping her and she no longer needs Care Coordination. Episode Closed.       MICHAEL Bahena, NEDA  Social Work Care Coordinator      
No Vaccines Administered.

## 2024-05-20 NOTE — PHYSICAL THERAPY INITIAL EVALUATION ADULT - CRITERIA FOR SKILLED THERAPEUTIC INTERVENTIONS
Products Recommended: Mineral based sunscreen\\n[facility] Detail Level: Detailed General Sunscreen Counseling: I recommended a broad spectrum sunscreen with a SPF of 30 or higher. impairments found/functional limitations in following categories/risk reduction/prevention

## 2025-03-31 ENCOUNTER — APPOINTMENT (OUTPATIENT)
Dept: INTERNAL MEDICINE | Facility: CLINIC | Age: 60
End: 2025-03-31
Payer: COMMERCIAL

## 2025-03-31 VITALS
BODY MASS INDEX: 28.88 KG/M2 | RESPIRATION RATE: 14 BRPM | SYSTOLIC BLOOD PRESSURE: 144 MMHG | TEMPERATURE: 97.8 F | OXYGEN SATURATION: 98 % | WEIGHT: 204 LBS | DIASTOLIC BLOOD PRESSURE: 93 MMHG | HEIGHT: 70.5 IN | HEART RATE: 84 BPM

## 2025-03-31 DIAGNOSIS — Z12.5 ENCOUNTER FOR SCREENING FOR MALIGNANT NEOPLASM OF PROSTATE: ICD-10-CM

## 2025-03-31 DIAGNOSIS — J02.9 ACUTE PHARYNGITIS, UNSPECIFIED: ICD-10-CM

## 2025-03-31 PROCEDURE — 99213 OFFICE O/P EST LOW 20 MIN: CPT

## 2025-03-31 PROCEDURE — G2211 COMPLEX E/M VISIT ADD ON: CPT | Mod: NC

## 2025-03-31 RX ORDER — AZITHROMYCIN 250 MG/1
250 TABLET, FILM COATED ORAL DAILY
Qty: 4 | Refills: 0 | Status: ACTIVE | COMMUNITY
Start: 2025-03-31 | End: 1900-01-01

## 2025-03-31 RX ORDER — FLUTICASONE PROPIONATE 50 UG/1
50 SPRAY, METERED NASAL DAILY
Qty: 1 | Refills: 0 | Status: ACTIVE | COMMUNITY
Start: 2025-03-31 | End: 1900-01-01

## 2025-04-01 ENCOUNTER — RESULT CHARGE (OUTPATIENT)
Age: 60
End: 2025-04-01

## 2025-04-07 LAB
FLUAV SPEC QL CULT: NEGATIVE
FLUBV AG SPEC QL IA: NEGATIVE
SARS-COV-2 AG RESP QL IA.RAPID: NEGATIVE

## 2025-04-28 ENCOUNTER — RX RENEWAL (OUTPATIENT)
Age: 60
End: 2025-04-28

## 2025-07-08 ENCOUNTER — NON-APPOINTMENT (OUTPATIENT)
Age: 60
End: 2025-07-08

## 2025-07-08 ENCOUNTER — APPOINTMENT (OUTPATIENT)
Dept: INTERNAL MEDICINE | Facility: CLINIC | Age: 60
End: 2025-07-08
Payer: COMMERCIAL

## 2025-07-08 VITALS
RESPIRATION RATE: 14 BRPM | SYSTOLIC BLOOD PRESSURE: 144 MMHG | DIASTOLIC BLOOD PRESSURE: 90 MMHG | WEIGHT: 200 LBS | TEMPERATURE: 98 F | OXYGEN SATURATION: 98 % | BODY MASS INDEX: 28.31 KG/M2 | HEIGHT: 70.5 IN | HEART RATE: 75 BPM

## 2025-07-08 PROBLEM — Z01.818 PREOPERATIVE EXAMINATION: Status: ACTIVE | Noted: 2025-07-08

## 2025-07-08 PROCEDURE — 99204 OFFICE O/P NEW MOD 45 MIN: CPT

## 2025-07-08 PROCEDURE — 93000 ELECTROCARDIOGRAM COMPLETE: CPT

## 2025-07-08 PROCEDURE — G2211 COMPLEX E/M VISIT ADD ON: CPT | Mod: NC

## 2025-07-11 PROBLEM — N42.9: Status: ACTIVE | Noted: 2025-07-11

## 2025-07-11 LAB
ALBUMIN SERPL ELPH-MCNC: 4.9 G/DL
ALP BLD-CCNC: 80 U/L
ALT SERPL-CCNC: 32 U/L
ANION GAP SERPL CALC-SCNC: 12 MMOL/L
APPEARANCE: CLEAR
APTT BLD: 35.2 SEC
AST SERPL-CCNC: 25 U/L
BACTERIA UR CULT: NORMAL
BACTERIA: NEGATIVE /HPF
BASOPHILS # BLD AUTO: 0.05 K/UL
BASOPHILS NFR BLD AUTO: 0.8 %
BILIRUB SERPL-MCNC: 1.4 MG/DL
BILIRUBIN URINE: NEGATIVE
BLOOD URINE: NEGATIVE
BUN SERPL-MCNC: 18 MG/DL
CALCIUM SERPL-MCNC: 10.1 MG/DL
CAST: 0 /LPF
CHLORIDE SERPL-SCNC: 105 MMOL/L
CO2 SERPL-SCNC: 24 MMOL/L
COLOR: YELLOW
CREAT SERPL-MCNC: 0.97 MG/DL
EGFRCR SERPLBLD CKD-EPI 2021: 89 ML/MIN/1.73M2
EOSINOPHIL # BLD AUTO: 0.17 K/UL
EOSINOPHIL NFR BLD AUTO: 2.9 %
EPITHELIAL CELLS: 0 /HPF
GLUCOSE QUALITATIVE U: NEGATIVE MG/DL
GLUCOSE SERPL-MCNC: 101 MG/DL
HCT VFR BLD CALC: 48.8 %
HGB BLD-MCNC: 16 G/DL
IMM GRANULOCYTES NFR BLD AUTO: 0.2 %
INR PPP: 0.91 RATIO
KETONES URINE: NEGATIVE MG/DL
LEUKOCYTE ESTERASE URINE: NEGATIVE
LYMPHOCYTES # BLD AUTO: 1.64 K/UL
LYMPHOCYTES NFR BLD AUTO: 27.7 %
MAN DIFF?: NORMAL
MCHC RBC-ENTMCNC: 29.7 PG
MCHC RBC-ENTMCNC: 32.8 G/DL
MCV RBC AUTO: 90.5 FL
MICROSCOPIC-UA: NORMAL
MONOCYTES # BLD AUTO: 0.47 K/UL
MONOCYTES NFR BLD AUTO: 8 %
NEUTROPHILS # BLD AUTO: 3.57 K/UL
NEUTROPHILS NFR BLD AUTO: 60.4 %
NITRITE URINE: NEGATIVE
PH URINE: 6
PLATELET # BLD AUTO: 219 K/UL
POTASSIUM SERPL-SCNC: 4.6 MMOL/L
PROT SERPL-MCNC: 7.4 G/DL
PROTEIN URINE: NEGATIVE MG/DL
PT BLD: 10.8 SEC
RBC # BLD: 5.39 M/UL
RBC # FLD: 12.9 %
RED BLOOD CELLS URINE: 0 /HPF
SODIUM SERPL-SCNC: 141 MMOL/L
SPECIFIC GRAVITY URINE: 1.01
UROBILINOGEN URINE: 0.2 MG/DL
WBC # FLD AUTO: 5.91 K/UL
WHITE BLOOD CELLS URINE: 0 /HPF